# Patient Record
Sex: FEMALE | Race: WHITE | NOT HISPANIC OR LATINO | Employment: OTHER | URBAN - METROPOLITAN AREA
[De-identification: names, ages, dates, MRNs, and addresses within clinical notes are randomized per-mention and may not be internally consistent; named-entity substitution may affect disease eponyms.]

---

## 2017-09-26 ENCOUNTER — RECORDS - HEALTHEAST (OUTPATIENT)
Dept: LAB | Facility: CLINIC | Age: 62
End: 2017-09-26

## 2017-09-26 LAB
CHOLEST SERPL-MCNC: 260 MG/DL
FASTING STATUS PATIENT QL REPORTED: YES
HDLC SERPL-MCNC: 52 MG/DL
LDLC SERPL CALC-MCNC: 177 MG/DL
TRIGL SERPL-MCNC: 155 MG/DL

## 2018-10-25 ENCOUNTER — RECORDS - HEALTHEAST (OUTPATIENT)
Dept: LAB | Facility: CLINIC | Age: 63
End: 2018-10-25

## 2018-10-25 LAB
ALBUMIN SERPL-MCNC: 4.3 G/DL (ref 3.5–5)
ALP SERPL-CCNC: 66 U/L (ref 45–120)
ALT SERPL W P-5'-P-CCNC: 34 U/L (ref 0–45)
ANION GAP SERPL CALCULATED.3IONS-SCNC: 14 MMOL/L (ref 5–18)
AST SERPL W P-5'-P-CCNC: 23 U/L (ref 0–40)
BILIRUB SERPL-MCNC: 0.8 MG/DL (ref 0–1)
BUN SERPL-MCNC: 21 MG/DL (ref 8–22)
CALCIUM SERPL-MCNC: 10.3 MG/DL (ref 8.5–10.5)
CHLORIDE BLD-SCNC: 105 MMOL/L (ref 98–107)
CHOLEST SERPL-MCNC: 149 MG/DL
CO2 SERPL-SCNC: 24 MMOL/L (ref 22–31)
CREAT SERPL-MCNC: 0.86 MG/DL (ref 0.6–1.1)
FASTING STATUS PATIENT QL REPORTED: YES
GFR SERPL CREATININE-BSD FRML MDRD: >60 ML/MIN/1.73M2
GLUCOSE BLD-MCNC: 114 MG/DL (ref 70–125)
HDLC SERPL-MCNC: 47 MG/DL
LDLC SERPL CALC-MCNC: 84 MG/DL
POTASSIUM BLD-SCNC: 4.1 MMOL/L (ref 3.5–5)
PROT SERPL-MCNC: 7 G/DL (ref 6–8)
SODIUM SERPL-SCNC: 143 MMOL/L (ref 136–145)
TRIGL SERPL-MCNC: 89 MG/DL

## 2018-11-08 ENCOUNTER — HOSPITAL ENCOUNTER (OUTPATIENT)
Dept: MAMMOGRAPHY | Facility: CLINIC | Age: 63
Discharge: HOME | End: 2018-11-08
Attending: FAMILY MEDICINE

## 2018-11-08 DIAGNOSIS — Z12.31 VISIT FOR SCREENING MAMMOGRAM: ICD-10-CM

## 2019-10-21 ENCOUNTER — RECORDS - HEALTHEAST (OUTPATIENT)
Dept: LAB | Facility: CLINIC | Age: 64
End: 2019-10-21

## 2019-10-21 LAB
ALBUMIN SERPL-MCNC: 4.4 G/DL (ref 3.5–5)
ALP SERPL-CCNC: 93 U/L (ref 45–120)
ALT SERPL W P-5'-P-CCNC: 67 U/L (ref 0–45)
ANION GAP SERPL CALCULATED.3IONS-SCNC: 15 MMOL/L (ref 5–18)
AST SERPL W P-5'-P-CCNC: 60 U/L (ref 0–40)
BILIRUB SERPL-MCNC: 0.8 MG/DL (ref 0–1)
BUN SERPL-MCNC: 21 MG/DL (ref 8–22)
CALCIUM SERPL-MCNC: 9.9 MG/DL (ref 8.5–10.5)
CHLORIDE BLD-SCNC: 107 MMOL/L (ref 98–107)
CHOLEST SERPL-MCNC: 173 MG/DL
CO2 SERPL-SCNC: 19 MMOL/L (ref 22–31)
CREAT SERPL-MCNC: 0.83 MG/DL (ref 0.6–1.1)
FASTING STATUS PATIENT QL REPORTED: NORMAL
GFR SERPL CREATININE-BSD FRML MDRD: >60 ML/MIN/1.73M2
GLUCOSE BLD-MCNC: 111 MG/DL (ref 70–125)
HDLC SERPL-MCNC: 55 MG/DL
LDLC SERPL CALC-MCNC: 101 MG/DL
POTASSIUM BLD-SCNC: 4.6 MMOL/L (ref 3.5–5)
PROT SERPL-MCNC: 7.3 G/DL (ref 6–8)
SODIUM SERPL-SCNC: 141 MMOL/L (ref 136–145)
TRIGL SERPL-MCNC: 86 MG/DL

## 2020-06-22 ENCOUNTER — RECORDS - HEALTHEAST (OUTPATIENT)
Dept: LAB | Facility: CLINIC | Age: 65
End: 2020-06-22

## 2020-06-22 LAB
ANION GAP SERPL CALCULATED.3IONS-SCNC: 17 MMOL/L (ref 5–18)
BUN SERPL-MCNC: 22 MG/DL (ref 8–22)
CALCIUM SERPL-MCNC: 10.3 MG/DL (ref 8.5–10.5)
CHLORIDE BLD-SCNC: 106 MMOL/L (ref 98–107)
CO2 SERPL-SCNC: 17 MMOL/L (ref 22–31)
CREAT SERPL-MCNC: 0.87 MG/DL (ref 0.6–1.1)
GFR SERPL CREATININE-BSD FRML MDRD: >60 ML/MIN/1.73M2
GLUCOSE BLD-MCNC: 95 MG/DL (ref 70–125)
POTASSIUM BLD-SCNC: ABNORMAL MMOL/L
SODIUM SERPL-SCNC: 140 MMOL/L (ref 136–145)

## 2020-07-20 ENCOUNTER — HOSPITAL ENCOUNTER (OUTPATIENT)
Dept: MAMMOGRAPHY | Facility: CLINIC | Age: 65
Discharge: HOME | End: 2020-07-20
Attending: FAMILY MEDICINE

## 2020-07-20 DIAGNOSIS — Z12.31 VISIT FOR SCREENING MAMMOGRAM: ICD-10-CM

## 2020-07-21 ENCOUNTER — RECORDS - HEALTHEAST (OUTPATIENT)
Dept: ADMINISTRATIVE | Facility: OTHER | Age: 65
End: 2020-07-21

## 2020-07-28 ENCOUNTER — HOSPITAL ENCOUNTER (OUTPATIENT)
Dept: MAMMOGRAPHY | Facility: CLINIC | Age: 65
Discharge: HOME | End: 2020-07-28
Attending: FAMILY MEDICINE

## 2020-07-28 DIAGNOSIS — N64.89 BREAST ASYMMETRY: ICD-10-CM

## 2020-08-05 ENCOUNTER — RECORDS - HEALTHEAST (OUTPATIENT)
Dept: ADMINISTRATIVE | Facility: OTHER | Age: 65
End: 2020-08-05

## 2020-09-22 ENCOUNTER — RECORDS - HEALTHEAST (OUTPATIENT)
Dept: LAB | Facility: CLINIC | Age: 65
End: 2020-09-22

## 2020-09-22 LAB
ALBUMIN SERPL-MCNC: 4.7 G/DL (ref 3.5–5)
ALP SERPL-CCNC: 67 U/L (ref 45–120)
ALT SERPL W P-5'-P-CCNC: 29 U/L (ref 0–45)
ANION GAP SERPL CALCULATED.3IONS-SCNC: 18 MMOL/L (ref 5–18)
AST SERPL W P-5'-P-CCNC: 29 U/L (ref 0–40)
BILIRUB SERPL-MCNC: 0.9 MG/DL (ref 0–1)
BUN SERPL-MCNC: 24 MG/DL (ref 8–22)
CALCIUM SERPL-MCNC: 10.6 MG/DL (ref 8.5–10.5)
CHLORIDE BLD-SCNC: 103 MMOL/L (ref 98–107)
CHOLEST SERPL-MCNC: 206 MG/DL
CO2 SERPL-SCNC: 19 MMOL/L (ref 22–31)
CREAT SERPL-MCNC: 0.99 MG/DL (ref 0.6–1.1)
FASTING STATUS PATIENT QL REPORTED: YES
GFR SERPL CREATININE-BSD FRML MDRD: 56 ML/MIN/1.73M2
GLUCOSE BLD-MCNC: 122 MG/DL (ref 70–125)
HDLC SERPL-MCNC: 57 MG/DL
LDLC SERPL CALC-MCNC: 128 MG/DL
POTASSIUM BLD-SCNC: 4.3 MMOL/L (ref 3.5–5)
PROT SERPL-MCNC: 7.9 G/DL (ref 6–8)
SODIUM SERPL-SCNC: 140 MMOL/L (ref 136–145)
TRIGL SERPL-MCNC: 104 MG/DL

## 2021-01-29 ENCOUNTER — HOSPITAL ENCOUNTER (OUTPATIENT)
Dept: MAMMOGRAPHY | Facility: CLINIC | Age: 66
Discharge: HOME | End: 2021-01-29
Attending: FAMILY MEDICINE

## 2021-01-29 ENCOUNTER — RECORDS - HEALTHEAST (OUTPATIENT)
Dept: ADMINISTRATIVE | Facility: OTHER | Age: 66
End: 2021-01-29

## 2021-01-29 DIAGNOSIS — Z09 FOLLOW-UP EXAM, 3-6 MONTHS SINCE PREVIOUS EXAM: ICD-10-CM

## 2021-01-29 DIAGNOSIS — N64.89 OTHER SPECIFIED DISORDERS OF BREAST: ICD-10-CM

## 2021-03-11 ENCOUNTER — RECORDS - HEALTHEAST (OUTPATIENT)
Dept: SCHEDULING | Facility: CLINIC | Age: 66
End: 2021-03-11

## 2021-03-11 DIAGNOSIS — Z12.31 VISIT FOR SCREENING MAMMOGRAM: ICD-10-CM

## 2021-05-24 ENCOUNTER — RECORDS - HEALTHEAST (OUTPATIENT)
Dept: ADMINISTRATIVE | Facility: CLINIC | Age: 66
End: 2021-05-24

## 2021-05-25 ENCOUNTER — RECORDS - HEALTHEAST (OUTPATIENT)
Dept: ADMINISTRATIVE | Facility: CLINIC | Age: 66
End: 2021-05-25

## 2021-05-26 ENCOUNTER — RECORDS - HEALTHEAST (OUTPATIENT)
Dept: ADMINISTRATIVE | Facility: CLINIC | Age: 66
End: 2021-05-26

## 2021-05-27 ENCOUNTER — RECORDS - HEALTHEAST (OUTPATIENT)
Dept: ADMINISTRATIVE | Facility: CLINIC | Age: 66
End: 2021-05-27

## 2021-05-28 ENCOUNTER — RECORDS - HEALTHEAST (OUTPATIENT)
Dept: ADMINISTRATIVE | Facility: CLINIC | Age: 66
End: 2021-05-28

## 2021-05-29 ENCOUNTER — RECORDS - HEALTHEAST (OUTPATIENT)
Dept: ADMINISTRATIVE | Facility: CLINIC | Age: 66
End: 2021-05-29

## 2021-05-31 ENCOUNTER — RECORDS - HEALTHEAST (OUTPATIENT)
Dept: ADMINISTRATIVE | Facility: CLINIC | Age: 66
End: 2021-05-31
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
## 2021-06-26 ENCOUNTER — HEALTH MAINTENANCE LETTER (OUTPATIENT)
Age: 66
End: 2021-06-26

## 2021-07-13 ENCOUNTER — RECORDS - HEALTHEAST (OUTPATIENT)
Dept: ADMINISTRATIVE | Facility: CLINIC | Age: 66
End: 2021-07-13

## 2021-07-21 ENCOUNTER — RECORDS - HEALTHEAST (OUTPATIENT)
Dept: ADMINISTRATIVE | Facility: CLINIC | Age: 66
End: 2021-07-21

## 2021-07-22 ENCOUNTER — RECORDS - HEALTHEAST (OUTPATIENT)
Dept: SCHEDULING | Facility: CLINIC | Age: 66
End: 2021-07-22

## 2021-07-22 ENCOUNTER — ANCILLARY PROCEDURE (OUTPATIENT)
Dept: MAMMOGRAPHY | Facility: CLINIC | Age: 66
End: 2021-07-22
Attending: FAMILY MEDICINE
Payer: COMMERCIAL

## 2021-07-22 DIAGNOSIS — Z12.31 VISIT FOR SCREENING MAMMOGRAM: ICD-10-CM

## 2021-07-22 DIAGNOSIS — Z12.31 OTHER SCREENING MAMMOGRAM: ICD-10-CM

## 2021-07-22 PROCEDURE — 77063 BREAST TOMOSYNTHESIS BI: CPT

## 2021-09-07 ENCOUNTER — LAB REQUISITION (OUTPATIENT)
Dept: LAB | Facility: CLINIC | Age: 66
End: 2021-09-07

## 2021-09-07 DIAGNOSIS — E78.5 HYPERLIPIDEMIA, UNSPECIFIED: ICD-10-CM

## 2021-09-07 DIAGNOSIS — I10 ESSENTIAL (PRIMARY) HYPERTENSION: ICD-10-CM

## 2021-09-07 LAB
ALBUMIN SERPL-MCNC: 4.6 G/DL (ref 3.5–5)
ALP SERPL-CCNC: 54 U/L (ref 45–120)
ALT SERPL W P-5'-P-CCNC: 27 U/L (ref 0–45)
ANION GAP SERPL CALCULATED.3IONS-SCNC: 14 MMOL/L (ref 5–18)
AST SERPL W P-5'-P-CCNC: 22 U/L (ref 0–40)
BILIRUB SERPL-MCNC: 1.2 MG/DL (ref 0–1)
BUN SERPL-MCNC: 26 MG/DL (ref 8–22)
CALCIUM SERPL-MCNC: 11.2 MG/DL (ref 8.5–10.5)
CHLORIDE BLD-SCNC: 101 MMOL/L (ref 98–107)
CHOLEST SERPL-MCNC: 194 MG/DL
CO2 SERPL-SCNC: 25 MMOL/L (ref 22–31)
CREAT SERPL-MCNC: 0.97 MG/DL (ref 0.6–1.1)
GFR SERPL CREATININE-BSD FRML MDRD: 61 ML/MIN/1.73M2
GLUCOSE BLD-MCNC: 135 MG/DL (ref 70–125)
HDLC SERPL-MCNC: 64 MG/DL
LDLC SERPL CALC-MCNC: 111 MG/DL
POTASSIUM BLD-SCNC: 4.2 MMOL/L (ref 3.5–5)
PROT SERPL-MCNC: 7.6 G/DL (ref 6–8)
SODIUM SERPL-SCNC: 140 MMOL/L (ref 136–145)
TRIGL SERPL-MCNC: 96 MG/DL

## 2021-09-07 PROCEDURE — 80053 COMPREHEN METABOLIC PANEL: CPT | Performed by: FAMILY MEDICINE

## 2021-09-07 PROCEDURE — 80061 LIPID PANEL: CPT | Performed by: FAMILY MEDICINE

## 2021-10-16 ENCOUNTER — HEALTH MAINTENANCE LETTER (OUTPATIENT)
Age: 66
End: 2021-10-16

## 2022-06-08 ENCOUNTER — LAB REQUISITION (OUTPATIENT)
Dept: LAB | Facility: CLINIC | Age: 67
End: 2022-06-08

## 2022-06-08 DIAGNOSIS — E78.5 HYPERLIPIDEMIA, UNSPECIFIED: ICD-10-CM

## 2022-06-08 DIAGNOSIS — I10 ESSENTIAL (PRIMARY) HYPERTENSION: ICD-10-CM

## 2022-06-08 LAB
ALBUMIN SERPL-MCNC: 4.3 G/DL (ref 3.5–5)
ALP SERPL-CCNC: 46 U/L (ref 45–120)
ALT SERPL W P-5'-P-CCNC: 23 U/L (ref 0–45)
ANION GAP SERPL CALCULATED.3IONS-SCNC: 16 MMOL/L (ref 5–18)
AST SERPL W P-5'-P-CCNC: 24 U/L (ref 0–40)
BILIRUB SERPL-MCNC: 1.3 MG/DL (ref 0–1)
BUN SERPL-MCNC: 27 MG/DL (ref 8–22)
CALCIUM SERPL-MCNC: 10.7 MG/DL (ref 8.5–10.5)
CHLORIDE BLD-SCNC: 100 MMOL/L (ref 98–107)
CHOLEST SERPL-MCNC: 193 MG/DL
CO2 SERPL-SCNC: 23 MMOL/L (ref 22–31)
CREAT SERPL-MCNC: 0.97 MG/DL (ref 0.6–1.1)
GFR SERPL CREATININE-BSD FRML MDRD: 63 ML/MIN/1.73M2
GLUCOSE BLD-MCNC: 140 MG/DL (ref 70–125)
HDLC SERPL-MCNC: 63 MG/DL
LDLC SERPL CALC-MCNC: 106 MG/DL
POTASSIUM BLD-SCNC: 4.1 MMOL/L (ref 3.5–5)
PROT SERPL-MCNC: 7.6 G/DL (ref 6–8)
SODIUM SERPL-SCNC: 139 MMOL/L (ref 136–145)
TRIGL SERPL-MCNC: 120 MG/DL

## 2022-06-08 PROCEDURE — 82040 ASSAY OF SERUM ALBUMIN: CPT | Performed by: FAMILY MEDICINE

## 2022-06-08 PROCEDURE — 80053 COMPREHEN METABOLIC PANEL: CPT | Performed by: FAMILY MEDICINE

## 2022-06-08 PROCEDURE — 80061 LIPID PANEL: CPT | Performed by: FAMILY MEDICINE

## 2022-07-23 ENCOUNTER — HEALTH MAINTENANCE LETTER (OUTPATIENT)
Age: 67
End: 2022-07-23

## 2022-07-26 ENCOUNTER — ANCILLARY PROCEDURE (OUTPATIENT)
Dept: MAMMOGRAPHY | Facility: CLINIC | Age: 67
End: 2022-07-26
Attending: FAMILY MEDICINE
Payer: COMMERCIAL

## 2022-07-26 DIAGNOSIS — Z12.31 VISIT FOR SCREENING MAMMOGRAM: ICD-10-CM

## 2022-07-26 PROCEDURE — 77067 SCR MAMMO BI INCL CAD: CPT

## 2022-09-25 ENCOUNTER — HEALTH MAINTENANCE LETTER (OUTPATIENT)
Age: 67
End: 2022-09-25

## 2023-06-06 ENCOUNTER — LAB REQUISITION (OUTPATIENT)
Dept: LAB | Facility: CLINIC | Age: 68
End: 2023-06-06

## 2023-06-06 DIAGNOSIS — I10 ESSENTIAL (PRIMARY) HYPERTENSION: ICD-10-CM

## 2023-06-06 DIAGNOSIS — E78.5 HYPERLIPIDEMIA, UNSPECIFIED: ICD-10-CM

## 2023-06-06 DIAGNOSIS — E83.52 HYPERCALCEMIA: ICD-10-CM

## 2023-06-06 LAB
ALBUMIN SERPL BCG-MCNC: 4.8 G/DL (ref 3.5–5.2)
ALP SERPL-CCNC: 55 U/L (ref 35–104)
ALT SERPL W P-5'-P-CCNC: 30 U/L (ref 10–35)
ANION GAP SERPL CALCULATED.3IONS-SCNC: 14 MMOL/L (ref 7–15)
AST SERPL W P-5'-P-CCNC: 24 U/L (ref 10–35)
BILIRUB SERPL-MCNC: 1.1 MG/DL
BUN SERPL-MCNC: 21.8 MG/DL (ref 8–23)
CALCIUM SERPL-MCNC: 10.6 MG/DL (ref 8.8–10.2)
CHLORIDE SERPL-SCNC: 100 MMOL/L (ref 98–107)
CHOLEST SERPL-MCNC: 190 MG/DL
CREAT SERPL-MCNC: 0.97 MG/DL (ref 0.51–0.95)
DEPRECATED HCO3 PLAS-SCNC: 25 MMOL/L (ref 22–29)
GFR SERPL CREATININE-BSD FRML MDRD: 63 ML/MIN/1.73M2
GLUCOSE SERPL-MCNC: 149 MG/DL (ref 70–99)
HDLC SERPL-MCNC: 61 MG/DL
LDLC SERPL CALC-MCNC: 104 MG/DL
NONHDLC SERPL-MCNC: 129 MG/DL
POTASSIUM SERPL-SCNC: 4.5 MMOL/L (ref 3.4–5.3)
PROT SERPL-MCNC: 7.2 G/DL (ref 6.4–8.3)
PTH-INTACT SERPL-MCNC: 18 PG/ML (ref 15–65)
SODIUM SERPL-SCNC: 139 MMOL/L (ref 136–145)
TRIGL SERPL-MCNC: 124 MG/DL

## 2023-06-06 PROCEDURE — 80053 COMPREHEN METABOLIC PANEL: CPT | Performed by: FAMILY MEDICINE

## 2023-06-06 PROCEDURE — 82306 VITAMIN D 25 HYDROXY: CPT | Performed by: FAMILY MEDICINE

## 2023-06-06 PROCEDURE — 80061 LIPID PANEL: CPT | Performed by: FAMILY MEDICINE

## 2023-06-06 PROCEDURE — 83970 ASSAY OF PARATHORMONE: CPT | Performed by: FAMILY MEDICINE

## 2023-06-07 LAB — DEPRECATED CALCIDIOL+CALCIFEROL SERPL-MC: 39 UG/L (ref 20–75)

## 2023-08-06 ENCOUNTER — HEALTH MAINTENANCE LETTER (OUTPATIENT)
Age: 68
End: 2023-08-06

## 2023-10-02 ENCOUNTER — ANCILLARY PROCEDURE (OUTPATIENT)
Dept: MAMMOGRAPHY | Facility: CLINIC | Age: 68
End: 2023-10-02
Attending: FAMILY MEDICINE
Payer: COMMERCIAL

## 2023-10-02 DIAGNOSIS — Z12.31 VISIT FOR SCREENING MAMMOGRAM: ICD-10-CM

## 2023-10-02 PROCEDURE — 77067 SCR MAMMO BI INCL CAD: CPT

## 2023-12-24 ENCOUNTER — HEALTH MAINTENANCE LETTER (OUTPATIENT)
Age: 68
End: 2023-12-24

## 2024-03-02 ENCOUNTER — HEALTH MAINTENANCE LETTER (OUTPATIENT)
Age: 69
End: 2024-03-02

## 2024-03-14 ENCOUNTER — TRANSFERRED RECORDS (OUTPATIENT)
Dept: HEALTH INFORMATION MANAGEMENT | Facility: CLINIC | Age: 69
End: 2024-03-14
Payer: COMMERCIAL

## 2024-03-14 ENCOUNTER — LAB REQUISITION (OUTPATIENT)
Dept: LAB | Facility: CLINIC | Age: 69
End: 2024-03-14

## 2024-03-14 DIAGNOSIS — I48.91 UNSPECIFIED ATRIAL FIBRILLATION (H): ICD-10-CM

## 2024-03-14 LAB
HBA1C MFR BLD: 6.9 % (ref 4.2–6.1)
INR PPP: 1.1 (ref 0.85–1.15)

## 2024-03-14 PROCEDURE — 80048 BASIC METABOLIC PNL TOTAL CA: CPT | Performed by: FAMILY MEDICINE

## 2024-03-14 PROCEDURE — 84443 ASSAY THYROID STIM HORMONE: CPT | Performed by: FAMILY MEDICINE

## 2024-03-14 PROCEDURE — 85610 PROTHROMBIN TIME: CPT | Performed by: FAMILY MEDICINE

## 2024-03-15 ENCOUNTER — MEDICAL CORRESPONDENCE (OUTPATIENT)
Dept: HEALTH INFORMATION MANAGEMENT | Facility: CLINIC | Age: 69
End: 2024-03-15
Payer: COMMERCIAL

## 2024-03-15 LAB
ANION GAP SERPL CALCULATED.3IONS-SCNC: 16 MMOL/L (ref 7–15)
BUN SERPL-MCNC: 31.4 MG/DL (ref 8–23)
CALCIUM SERPL-MCNC: 11.6 MG/DL (ref 8.8–10.2)
CHLORIDE SERPL-SCNC: 98 MMOL/L (ref 98–107)
CREAT SERPL-MCNC: 1.08 MG/DL (ref 0.51–0.95)
DEPRECATED HCO3 PLAS-SCNC: 25 MMOL/L (ref 22–29)
EGFRCR SERPLBLD CKD-EPI 2021: 55 ML/MIN/1.73M2
GLUCOSE SERPL-MCNC: 148 MG/DL (ref 70–99)
POTASSIUM SERPL-SCNC: 4 MMOL/L (ref 3.4–5.3)
SODIUM SERPL-SCNC: 139 MMOL/L (ref 135–145)
TSH SERPL DL<=0.005 MIU/L-ACNC: 3.89 UIU/ML (ref 0.3–4.2)

## 2024-03-21 ENCOUNTER — DOCUMENTATION ONLY (OUTPATIENT)
Dept: CARDIOLOGY | Facility: CLINIC | Age: 69
End: 2024-03-21

## 2024-03-21 ENCOUNTER — OFFICE VISIT (OUTPATIENT)
Dept: CARDIOLOGY | Facility: CLINIC | Age: 69
End: 2024-03-21
Payer: COMMERCIAL

## 2024-03-21 ENCOUNTER — TELEPHONE (OUTPATIENT)
Dept: CARDIOLOGY | Facility: CLINIC | Age: 69
End: 2024-03-21

## 2024-03-21 VITALS
RESPIRATION RATE: 16 BRPM | HEART RATE: 123 BPM | WEIGHT: 174.6 LBS | DIASTOLIC BLOOD PRESSURE: 95 MMHG | SYSTOLIC BLOOD PRESSURE: 131 MMHG | OXYGEN SATURATION: 97 %

## 2024-03-21 DIAGNOSIS — I48.19 PERSISTENT ATRIAL FIBRILLATION (H): Primary | ICD-10-CM

## 2024-03-21 PROCEDURE — 93000 ELECTROCARDIOGRAM COMPLETE: CPT | Performed by: STUDENT IN AN ORGANIZED HEALTH CARE EDUCATION/TRAINING PROGRAM

## 2024-03-21 PROCEDURE — 99204 OFFICE O/P NEW MOD 45 MIN: CPT | Performed by: INTERNAL MEDICINE

## 2024-03-21 PROCEDURE — G2211 COMPLEX E/M VISIT ADD ON: HCPCS | Performed by: INTERNAL MEDICINE

## 2024-03-21 RX ORDER — B-COMPLEX WITH VITAMIN C
TABLET ORAL
COMMUNITY

## 2024-03-21 RX ORDER — ATORVASTATIN CALCIUM 10 MG/1
1 TABLET, FILM COATED ORAL DAILY
COMMUNITY

## 2024-03-21 RX ORDER — SOTALOL HYDROCHLORIDE 80 MG/1
80 TABLET ORAL 2 TIMES DAILY
Qty: 60 TABLET | Refills: 11 | Status: SHIPPED | OUTPATIENT
Start: 2024-03-21 | End: 2024-05-23

## 2024-03-21 RX ORDER — VITAMIN E 268 MG
1 CAPSULE ORAL DAILY
COMMUNITY

## 2024-03-21 RX ORDER — METOPROLOL TARTRATE 25 MG/1
25 TABLET, FILM COATED ORAL 2 TIMES DAILY
COMMUNITY
Start: 2024-03-14 | End: 2024-03-29

## 2024-03-21 RX ORDER — METFORMIN HCL 500 MG
500 TABLET, EXTENDED RELEASE 24 HR ORAL 2 TIMES DAILY WITH MEALS
COMMUNITY
Start: 2023-06-28

## 2024-03-21 RX ORDER — RIVAROXABAN 20 MG/1
20 TABLET, FILM COATED ORAL DAILY
COMMUNITY
Start: 2024-03-14

## 2024-03-21 RX ORDER — ASPIRIN 81 MG/1
81 TABLET ORAL DAILY
Status: ON HOLD | COMMUNITY
End: 2024-04-04

## 2024-03-21 RX ORDER — VIT A/VIT C/VIT E/ZINC/COPPER 2148-113
1 TABLET ORAL 2 TIMES DAILY
COMMUNITY

## 2024-03-21 RX ORDER — AMLODIPINE AND BENAZEPRIL HYDROCHLORIDE 5; 40 MG/1; MG/1
1 CAPSULE ORAL DAILY
COMMUNITY

## 2024-03-21 NOTE — TELEPHONE ENCOUNTER
Yuki Seals MD  You1 hour ago (2:54 PM)     AYLEEN Sweeney,    Thanks for looking into that!  Can plan to start sotalol late next week with ECG 3/29/2024, TTE and starting 30d MCT next week, and DCCV the following week.  She started rivaroxaban on 3/14/2024 - if DCCV is within 3 weeks of that date, she would need DEANA/DCCV.  If outside of 3 weeks from 3/14/2024, can proceed with DCCV without DEANA assuming no missed rivaroxaban doses.    Yuki Son MD4 hours ago (11:44 AM)     CJ  Dr. Seals,  There are no DEANA/DCCV spots available next week. She is also concerned with driving in the snow early next week.  Are you ok with starting Sotalol late next week, doing an EKG on Friday 3-29-24 and then DCCV with NO DEAAN the week after as long as she has been taking Xarelto appropriately?  Then continue with 30 MCOT/echo after cardioversion is complete?  Thank you,  Zahra

## 2024-03-21 NOTE — TELEPHONE ENCOUNTER
Noted.  Phone call to patient and reviewed the below information.  Soonest available DEANA is 4-4-24.  Pt states she can tolerate her symptoms and is agreeable to Cardioversion without DEANA ( this will be 21 days of Xarelto) on 4-4-24.  She has been taking Xarelto with a meal every day without missing any doses.  Encouraged her to continue taking daily with a meal and no missed doses.     Will try to reschedule MCOT, Echo and EKG to late next week.  Will discuss Sotalol start when this is scheduled.    She states understanding, answered all questions.

## 2024-03-21 NOTE — PROGRESS NOTES
H&P  PMD: []  Date: Card OV: [x]  Date: 3-21 Sent for Teach []   Orders: I [x] P  [x]      AC: Franki NP Med Review: NEEDS review  -Sotalol 80 mg bid  -Metoprolol tartrate 25 mg bid    DM Meds:  Metformin  GLP-1:None       Cathy Randall, 1955, [id number]  Home:[phone] (home) Cell:[phone] (mobile)  Emergency Contact: Robyn Osorio [phone]  PCP: Ankur Queen, [phone]    +++Important patient information for CSC/Cath Lab staff : None+++    [facility] EP Cath Lab Procedure Order   Procedure:Cardioversion for AF  Ordering Provider: Dr Arnel Aguilera Ordered and Prepped: 3/21/2024 Zahra Eubanks RN  Anticipated Case Duration:  Standard  Scheduling Needs/Timeframe: after 4-4-24  Scheduling Contact: Please contact pt to schedule  Cardiology Follow Up Apt s/p: Standard- EP LYNNETTE @ 4-6 wks or previously scheduled General Card apt  Current Device/Device Co Needed for Procedure: None NoneNone  Pre-Procedural Testing needed: None  Anesthesia:  General    [facility] EP Cath Lab Prep   H&P:  Compled by cardiology on 3-21-24 if scheduled within 30 days, pt to schedule with PMD if procedure outside of this timeframe  Pre-op Labs: K if pt taking diuretic medication or hx of low Potassium, Beta HcG if appropriate, and INR if on Warfarin will be ordered AM of procedure and Review of most recent labs, WEL for procedure  T&S Pre-Procedure Review: T&S is not required for DCCV/DFT Testing  Medical Records Pertinent for Procedure:  None  Iodinated Contrast Dye Allergies (Does not include Shellfish, Egg, and/or Iodine Allergy): NA  GLP-1 Protocol: If on Dulaglutide (Trulicity) (weekly)- Injection hold 7 days prior to procedure  , Exenatide extended release (Bydureon bcise) (weekly)- Injection hold 7 days prior to procedure, Exenatide (Byetta) (twice daily)- Oral Tablet hold day prior and morning of procedure and for Injection hold 7 days prior to procedure, Semaglutide (Ozempic) (weekly)- Injection and Oral hold 7 days  prior to procedure, Liraglutide (Victoza, Saxenda) (daily)- Injection hold day prior and morning of procedure    Allergies   Allergen Reactions    Aspirin Unknown     Doesn't take due to bleeding risk    Penicillins Unknown    Sulfa (Sulfonamide Antibiotics) [Sulfa Antibiotics] Unknown       Current Outpatient Medications:     amLODIPine-benazepril (LOTREL) 5-40 MG capsule, Take 1 capsule by mouth daily, Disp: , Rfl:     aspirin 81 MG EC tablet, Take 81 mg by mouth daily, Disp: , Rfl:     atorvastatin (LIPITOR) 10 MG tablet, Take 1 tablet by mouth daily, Disp: , Rfl:     Calcium Carbonate-Vitamin D 600-5 MG-MCG TABS, 2 tablet with a meal Orally Once a day, Disp: , Rfl:     metFORMIN (GLUCOPHAGE XR) 500 MG 24 hr tablet, Take 500 mg by mouth 2 times daily (with meals), Disp: , Rfl:     metoprolol tartrate (LOPRESSOR) 25 MG tablet, Take 25 mg by mouth 2 times daily, Disp: , Rfl:     Multiple Vitamin (MULTIVITAMIN ADULT PO), Take by mouth daily, Disp: , Rfl:     Multiple Vitamins-Minerals (PRESERVISION AREDS) TABS, 2 tab orally BID, Disp: , Rfl:     sotalol (BETAPACE) 80 MG tablet, Take 1 tablet (80 mg) by mouth 2 times daily, Disp: 60 tablet, Rfl: 11    vitamin E (TOCOPHEROL) 400 units (180 mg) capsule, Take 1 tablet by mouth daily, Disp: , Rfl:     XARELTO ANTICOAGULANT 20 MG TABS tablet, Take 20 mg by mouth daily, Disp: , Rfl:     Documentation Date:3/21/2024 9:40 AM  Zahra Eubanks RN

## 2024-03-21 NOTE — PATIENT INSTRUCTIONS
[facility]  Cardiac Electrophysiology  [address]  [place] [address]   Office: [phone]  Fax: [phone]     Thank you for seeing us in clinic today - it is a pleasure to be a part of your care team.  Below is a summary of our plan from today's visit.       You have persistent atrial fibrillation.  We reviewed atrial fibrillation, stroke risk prevention, and atrial fibrillation treatment options including antiarrhythmic drug therapy, catheter ablation.      We will plan for the following:  - echocardiogram (heart ultrasound)  - 30 day mobile cardiac telemetry  - start sotalol 80mg twice daily.  Our office will coordinate an ECG in 3 days, transesophageal echocardiogram and cardioversion thereafter  - continue metoprolol 25mg twice daily  - continue riaroxaban 20mg daily  - consider a [employer] device or an Apple Watch (ECG capability)     Please do not hesitate to be in touch with our office at [phone] with any questions that may arise.       Thank you for trusting us with your care,    Yuki Seals MD  Clinical Cardiac Electrophysiology  [facility]  [address]  [place] [address]   Office: [phone]  Fax: [phone]        ATRIAL FIBRILLATION: Patient Information    What is atrial fibrillation?  Atrial fibrillation (AF, A-fib) is a common heart rhythm problem (arrhythmia) occurring within the upper chambers of the heart (the atria).  In normal rhythm, the upper and lower chambers of the heart are electrically driven to contract in a coordinated sequence.  In atrial fibrillation, the atria lose their ability to contract because of rapid and chaotic electrical activity.  The lower chambers of the heart (the ventricles) continue to pump blood throughout the body, though with irregular and often faster rate due to the chaotic activity within the atria.        How do I know if I have atrial fibrillation?   Some  people may feel their heart beating faster, harder, or irregularly while in atrial fibrillation.  Others may be lightheaded, fatigued, feel weak or tired or become more short of breath especially with activities.  Some patients have no symptoms at all.  Atrial fibrillation may be found due to an irregular pulse or on an electrocardiogram (ECG). Atrial fibrillation can start and stop on its own, and episodes can last from seconds to several months.      How common is atrial fibrillation?   An estimated 3-6 million people in the [place] have atrial fibrillation.  Atrial fibrillation is a common heart rhythm problem for older persons, affecting as estimated 12-15% of people over the age of 65 years of age.    What causes atrial fibrillation?   Age is the most important risk factor for atrial fibrillation.  Atrial fibrillation is more common in people with other heart disease, high blood pressure, diabetes, obesity, sleep apnea and in people who regularly consume alcohol.  Surgery, lung disease, or thyroid problems can lead to atrial fibrillation.  Atrial fibrillation has multiple possible causes, and in most cases a single cause cannot be found.  Atrial fibrillation is a progressive condition, usually starting with at an early stage with short and infrequent episodes.  In later stages of disease, more frequent and longer lasting episodes of atrial fibrillation occur, ultimately culminating in episodes which do not spontaneously terminate.  Generally, more enlargement and scarring within the upper chambers of the heart is observed as atrial fibrillation progresses from early to late-stage disease.    How is atrial fibrillation diagnosed and evaluated?    Because of its start-stop nature, atrial fibrillation can be challenging to diagnose.  Atrial fibrillation is most commonly diagnosed via cardiac rhythm recordings - either an ECG or wearable cardiac rhythm monitor.  For patients with pacemakers, defibrillators or  implantable loop recorders, atrial fibrillation may be recorded via these devices.  Recently, commercially available devices (eg. Apple Watch, [employer] device, certain FitBit devices, others) can allow patients to take 30 second cardiac rhythm recordings which may document atrial fibrillation.  Once atrial fibrillation is diagnosed, additional tests include blood tests and an echocardiogram.  The echocardiogram uses ultrasound to look at your heart to assess your cardiac function and evaluate for other heart disease.  Additional evaluation may include CT or MRI studies.    Is atrial fibrillation dangerous?   Atrial fibrillation is not usually a life-threatening arrhythmia.  The most serious consequences of atrial fibrillation including stroke and worsening of overall cardiac function.  While in atrial fibrillation, the upper cardiac chambers do not contract normally, resulting in slower blood flow and increased risk of clot formation.  If this blood clot becomes detached from the heart a stroke can occur.  Unfortunately, stroke can be the first sign of atrial fibrillation for some people.  With a stroke, you may notice abnormal sensation, weakness on one side of the body or face, changes in your vision or speech.  If you have any of these signs, you should contact EMS and be evaluated in an emergency room as soon as possible.      How is atrial fibrillation treated?     Several treatment options exist for suppressing atrial fibrillation - however, it is not an easily curable arrhythmia.  The first goal in managing atrial fibrillation is to minimize stroke risk.  The second goal is to improve symptoms associated with atrial fibrillation.  Finally, in patients with reduced cardiac function, maintaining normal rhythm can help improve cardiac function.      Blood thinners are used to reduce the risk of stroke in patients with high estimated stroke risk related to atrial fibrillation.  For patients at higher risk of  bleeding related to blood thinner use, implantable devices may be an option to reduce stroke risk without the need for long term blood thinner use.      Atrial fibrillation can be managed via two strategies: rate control and rhythm control.  In a rate control strategy, continued atrial fibrillation is accepted and medications (eg. beta-blockers or calcium channel blockers) are used to control the lower chamber rate.  In a rhythm control strategy, anti-arrhythmic medications or catheter ablation are used to maintain normal cardiac rhythm and slow disease progression by suppressing atrial fibrillation.  A procedure called a cardioversion, in which an electric shock is delivered through patches placed on the chest wall while under deep sedation, can be performed to temporarily restore normal cardiac rhythm, though does not address the chance of atrial fibrillation recurrence.  Treatments are more effective for earlier rather than later stage atrial fibrillation.  Lifestyle modifications (maintaining a healthy weight, aerobic exercise, diagnosing and treating sleep apnea, and minimizing alcohol intake) are important elements of atrial fibrillation rhythm control.     What is catheter ablation for atrial fibrillation?  Cardiac catheter ablation is a commonly performed, minimally invasive procedure performed by a cardiac electrophysiologist to treat many different cardiac rhythm abnormalities.  During catheter ablation, long, thin, flexible tubes are advanced into the heart via small sheaths inserted into the femoral veins and thermal energy (either heating or cooling) is applied within the heart to disrupt abnormal electrical activity.  Atrial fibrillation ablation is performed under general anesthesia, with procedures generally taking approximately 2-3 hours.  Patients are typically observed for 3-5 hours after the ablation, and in most cases can be discharged home the same day.  Atrial fibrillation ablation is  associated with better outcomes (mortality, cardiovascular hospitalizations, atrial arrhythmia recurrences) compared to antiarrhythmic drug therapy.  However, atrial fibrillation recurrences are not uncommon, and repeat catheter ablation may be offered.  Your electrophysiology team can review atrial fibrillation ablation, anticipated success rates, risks, and recovery expectations with you.    What are anti-arrhythmic medications?  Anti-arrhythmic medications are specialized drugs which alter cardiac electrical functioning to suppress arrhythmias.  There are several anti-arrhythmic medications available, each with its own success rate and side effects.  Some anti-arrhythmic medications are less effective though safer to use, others are more effective though have serious potential toxicities.  Atrial fibrillation recurrences are common and may require dose adjustment or change in antiarrhythmic therapy.  Your electrophysiology team will carefully consider which medication would be the best and safest for your particular case.      Can I live a normal life?    The goal of atrial fibrillation management is for patients to live normal lives without being limited by symptoms related to atrial fibrillation.    Are any additional educational resources available?  There are a number of excellent atrial fibrillation education resources available to you online.  A few options you may wish to review include:  hrsonline.org/guide-atrial-fibrillation  afibmatters.org  getsmartaboutafib.com  stopaf.com    What comes next?    Consider your management options and let us know how we can help in your decision process.  Please take medications as they have been prescribed.  You should also get any tests that may have been ordered for you.      When to Call Your Doctor or seek emergency care:  Call your doctor or seek emergency care if you have any significant changes with the  following:  Weakness  Dizziness  Fainting  Fatigue  Shortness of breath  Chest pain with increased activity  If you are concerned that your heart rate is too fast or too slow  Bleeding that does not stop in 10 minutes  Coughing or throwing up blood  Bloody diarrhea or bleeding hemorrhoids  Dark-colored urine or black stool  Allergic reactions:  Rash  Itching  Swelling  Trouble breathing or swallowing      Please call the [facility] at [phone] if you have concerns about your symptoms, your medicines, or your follow-up appointments.

## 2024-03-21 NOTE — TELEPHONE ENCOUNTER
Phone call to patient to discuss Cardioversion plans and starting Sotalol.    Orders for pt include:  Start Sotalol 80 mg bid, in 3 days have EKG to verify ryhthm  DEANA/DCCV late next week  30 day MCOT  TTE.    Pt states that she does not want to come to the clinic early next week due to possible extreme weather and poor driving conditions.  Suggest that she have EKG same day as her DEANA/DCCV.  Pt is very overwhelmed and confused about all of the testing suggested.  Reviewed her chart and discussed rationale for the above procedures.  She did not start Xarelto until 3-14-24 as ordered by her PMD.  Will clarify echo orders with Dr. Seals.  Pt will be contacted to schedule.

## 2024-03-21 NOTE — LETTER
3/21/2024    Ankur Queen MD  [address] [place] [address]    RE: Cathy Randall       Dear Colleague,     I had the [facility] of seeing Cathy Randall in the [facility].     [facility]  Cardiac Electrophysiology  [address]  [place] [address]   Office: [phone]  Fax: [phone]     Cardiac Electrophysiology Consultation    Patient: Cathy Randall   : 1955     Referring Provider: Ankur Queen MD  Primary Care Provider: Ankur Queen MD    CHIEF COMPLAINT/REASON FOR VISIT  Persistent atrial fibrillation      Assessment/Recommendations   Cathy Randall is a 68 year old female with persistent atrial fibrillation, HTN, T2DM referred by Dr. Queen for consultation regarding atrial fibrillation.    Stage 3B/Persistent atrial fibrillation - symptomatic with  mild exertional dyspnea, fatigue  [id number]  We reviewed atrial fibrillation physiology and considerations including managing stroke risk, rate control, cardioversion, antiarrhythmic drug therapy, and catheter ablation.  We discussed atrial fibrillation ablation procedures, anticipated success rates, the potential need for re-do ablation vs addition of anti-arrhythmic drugs, procedural risks (including groin bleeding, tamponade, phrenic or esophageal injury, stroke, pulmonary vein stenosis) and recovery expectations.  We will plan for the following:  - TTE  - 30 day mobile cardiac telemetry  - start sotalol 80mg twice daily (QT//458ms), ECG in 3 days, DEANA/DCCV thereafter  - continue metoprolol 25mg twice daily  - continue riaroxaban 20mg daily  - consider a [employer] device or an Apple Watch  - we discussed the ongoing importance of lifestyle modification (maintaining a healthy weight, aerobic activity, sleep apnea diagnosis and management, alcohol avoidance) as part of a long term strategy for atrial fibrillation management    Follow up: as above            History of Present Illness   Cathy Randall is a 68 year old female with persistent atrial fibrillation, HTN, T2DM referred by Dr. Queen for consultation regarding atrial fibrillation.    Mrs. Randall's atrial fibrillation history is as summarized below:  Symptoms: mild exertional dyspnea, fatigue  Symptom onset date: around 2/2024  Diagnosis date: 3/14/2024 (routine clinic viist)  Admissions/ER visits: none  Prior medical therapies: metoprolol (3/14/2024-present)  Prior DCCVs: none  Prior ablations: none  Percutaneous left atrial appendage occlusion: none    She denies chest pain, syncope.       Physical Examination  Review of Systems   VITALS: BP (!) 131/95 (BP Location: Left arm, Patient Position: Sitting, Cuff Size: Adult Regular)   Pulse (!) 123   Resp 16   Wt 79.2 kg (174 lb 9.6 oz)   SpO2 97%   Wt Readings from Last 3 Encounters:   No data found for Wt     CONSTITUTIONAL: well nourished, comfortable, no distress  EYES:  Conjunctivae pink, sclerae clear.    E/N/T:  Oral mucosa pink  RESPIRATORY:  Respiratory effort is normal  CARDIOVASCULAR:  irregular, normal S1 and S2  GASTROINTESTINAL:  Abdomen without masses or tenderness  EXTREMITIES:  No clubbing or cyanosis.    MUSCULOSKELETAL:  Overall grossly normal muscle strength  SKIN:  Overall, skin warm and dry, no lesions.  NEURO/PSYCH:  Oriented x 3 with normal affect.   Constitutional:  No weight loss or loss of appetite    Eyes:  No difficulty with vision, no double vision, no dry eyes  ENT:  No sore throat, difficulty swallowing; changes in hearing or tinnitus  Cardiovascular: As detailed above  Respiratory:  No cough  Musculoskeletal  No joint pain, muscle aches  Neurologic:  No syncope, lightheadedness, fainting spells   Hematologic: No easy bruising, excessive bleeding tendency   Gastrointestinal:  No jaundice, abdominal pain or abdominal bloating  Genitourinary: No changes in urinary habits, no trouble urinating    Psychiatric: No  "anxiety or depression      Medical History  Surgical History   No past medical history on file. No past surgical history on file.      Family History Social History   Family History   Problem Relation Age of Onset    Breast Cancer No family hx of                Medications  Allergies   No current outpatient medications on file.     Allergies   Allergen Reactions    Aspirin Unknown     Doesn't take due to bleeding risk    Penicillins Unknown    Sulfa (Sulfonamide Antibiotics) [Sulfa Antibiotics] Unknown          Lab Results    Chemistry CBC Cardiac Enzymes/BNP/TSH/INR   Recent Labs   Lab Test 03/14/24  1122      POTASSIUM 4.0   CHLORIDE 98   CO2 25   *   BUN 31.4*   CR 1.08*   GFRESTIMATED 55*   JOY 11.6*     Recent Labs   Lab Test 03/14/24  1122 06/06/23  0909 06/08/22  0921   CR 1.08* 0.97* 0.97          No results for input(s): \"WBC\", \"HGB\", \"HCT\", \"MCV\", \"PLT\" in the last 17805 hours.  No results for input(s): \"HGB\" in the last 33311 hours. No results for input(s): \"TROPONINI\" in the last 87244 hours.  No results for input(s): \"BNP\", \"NTBNPI\", \"NTBNP\" in the last 24941 hours.  Recent Labs   Lab Test 03/14/24  1122   TSH 3.89     Recent Labs   Lab Test 03/14/24  1122   INR 1.10         Data Review    ECGs (tracings independently reviewed)  3/21/2024 - AF, ventricular rate 123bpm         Cc: Ankur Queen MD Amila Dilusha William, MD  3/21/2024  8:32 AM      Thank you for allowing me to participate in the care of your patient.      Sincerely,     Yuki Seals MD     [facility]  cc:   Ankur Queen MD  [address] [place] [address]  "

## 2024-03-21 NOTE — PROGRESS NOTES
[facility]  Cardiac Electrophysiology  [address]  [place] [address]   Office: [phone]  Fax: [phone]     Cardiac Electrophysiology Consultation    Patient: Cathy Randall   : 1955     Referring Provider: Ankur Queen MD  Primary Care Provider: Ankur Queen MD    CHIEF COMPLAINT/REASON FOR VISIT  Persistent atrial fibrillation      Assessment/Recommendations   Cathy Randall is a 68 year old female with persistent atrial fibrillation, HTN, T2DM referred by Dr. Queen for consultation regarding atrial fibrillation.    Stage 3B/Persistent atrial fibrillation - symptomatic with  mild exertional dyspnea, fatigue  [id number]  We reviewed atrial fibrillation physiology and considerations including managing stroke risk, rate control, cardioversion, antiarrhythmic drug therapy, and catheter ablation.  We discussed atrial fibrillation ablation procedures, anticipated success rates, the potential need for re-do ablation vs addition of anti-arrhythmic drugs, procedural risks (including groin bleeding, tamponade, phrenic or esophageal injury, stroke, pulmonary vein stenosis) and recovery expectations.  We will plan for the following:  - TTE  - 30 day mobile cardiac telemetry  - start sotalol 80mg twice daily (QT//458ms), ECG in 3 days, DEANA/DCCV thereafter  - continue metoprolol 25mg twice daily  - continue riaroxaban 20mg daily  - consider a [employer] device or an Apple Watch  - we discussed the ongoing importance of lifestyle modification (maintaining a healthy weight, aerobic activity, sleep apnea diagnosis and management, alcohol avoidance) as part of a long term strategy for atrial fibrillation management    Follow up: as above           History of Present Illness   Cathy Randall is a 68 year old female with persistent atrial fibrillation, HTN, T2DM referred by Dr. Queen for consultation regarding atrial fibrillation.    Mrs. Randall's  atrial fibrillation history is as summarized below:  Symptoms: mild exertional dyspnea, fatigue  Symptom onset date: around 2/2024  Diagnosis date: 3/14/2024 (routine clinic viist)  Admissions/ER visits: none  Prior medical therapies: metoprolol (3/14/2024-present)  Prior DCCVs: none  Prior ablations: none  Percutaneous left atrial appendage occlusion: none    She denies chest pain, syncope.       Physical Examination  Review of Systems   VITALS: BP (!) 131/95 (BP Location: Left arm, Patient Position: Sitting, Cuff Size: Adult Regular)   Pulse (!) 123   Resp 16   Wt 79.2 kg (174 lb 9.6 oz)   SpO2 97%   Wt Readings from Last 3 Encounters:   No data found for Wt     CONSTITUTIONAL: well nourished, comfortable, no distress  EYES:  Conjunctivae pink, sclerae clear.    E/N/T:  Oral mucosa pink  RESPIRATORY:  Respiratory effort is normal  CARDIOVASCULAR:  irregular, normal S1 and S2  GASTROINTESTINAL:  Abdomen without masses or tenderness  EXTREMITIES:  No clubbing or cyanosis.    MUSCULOSKELETAL:  Overall grossly normal muscle strength  SKIN:  Overall, skin warm and dry, no lesions.  NEURO/PSYCH:  Oriented x 3 with normal affect.   Constitutional:  No weight loss or loss of appetite    Eyes:  No difficulty with vision, no double vision, no dry eyes  ENT:  No sore throat, difficulty swallowing; changes in hearing or tinnitus  Cardiovascular: As detailed above  Respiratory:  No cough  Musculoskeletal  No joint pain, muscle aches  Neurologic:  No syncope, lightheadedness, fainting spells   Hematologic: No easy bruising, excessive bleeding tendency   Gastrointestinal:  No jaundice, abdominal pain or abdominal bloating  Genitourinary: No changes in urinary habits, no trouble urinating    Psychiatric: No anxiety or depression      Medical History  Surgical History   No past medical history on file. No past surgical history on file.      Family History Social History   Family History   Problem Relation Age of Onset     "Breast Cancer No family hx of                Medications  Allergies   No current outpatient medications on file.     Allergies   Allergen Reactions    Aspirin Unknown     Doesn't take due to bleeding risk    Penicillins Unknown    Sulfa (Sulfonamide Antibiotics) [Sulfa Antibiotics] Unknown          Lab Results    Chemistry CBC Cardiac Enzymes/BNP/TSH/INR   Recent Labs   Lab Test 03/14/24  1122      POTASSIUM 4.0   CHLORIDE 98   CO2 25   *   BUN 31.4*   CR 1.08*   GFRESTIMATED 55*   JOY 11.6*     Recent Labs   Lab Test 03/14/24  1122 06/06/23  0909 06/08/22  0921   CR 1.08* 0.97* 0.97          No results for input(s): \"WBC\", \"HGB\", \"HCT\", \"MCV\", \"PLT\" in the last 00693 hours.  No results for input(s): \"HGB\" in the last 79664 hours. No results for input(s): \"TROPONINI\" in the last 23768 hours.  No results for input(s): \"BNP\", \"NTBNPI\", \"NTBNP\" in the last 62704 hours.  Recent Labs   Lab Test 03/14/24  1122   TSH 3.89     Recent Labs   Lab Test 03/14/24  1122   INR 1.10         Data Review    ECGs (tracings independently reviewed)  3/21/2024 - AF, ventricular rate 123bpm         Cc: Ankur Queen MD Amila Dilusha William, MD  3/21/2024  8:32 AM      "

## 2024-03-22 LAB
ATRIAL RATE - MUSE: 357 BPM
DIASTOLIC BLOOD PRESSURE - MUSE: NORMAL MMHG
INTERPRETATION ECG - MUSE: NORMAL
P AXIS - MUSE: NORMAL DEGREES
PR INTERVAL - MUSE: NORMAL MS
QRS DURATION - MUSE: 92 MS
QT - MUSE: 320 MS
QTC - MUSE: 458 MS
R AXIS - MUSE: 54 DEGREES
SYSTOLIC BLOOD PRESSURE - MUSE: NORMAL MMHG
T AXIS - MUSE: -50 DEGREES
VENTRICULAR RATE- MUSE: 123 BPM

## 2024-03-22 NOTE — PROGRESS NOTES
Called pt back and discussed the need to stop metoprolol when starting Sotalol.     Jennifer Lucero RN

## 2024-03-22 NOTE — PROGRESS NOTES
Returned call to pt and clarified start time for Sotalol to be Wednesday due to getting EKG on Friday. Answered many questions regarding CV, monitor, EKG, and echo.     Jennifer Lucero RN

## 2024-03-26 ENCOUNTER — PREP FOR PROCEDURE (OUTPATIENT)
Dept: CARDIOLOGY | Facility: CLINIC | Age: 69
End: 2024-03-26
Payer: COMMERCIAL

## 2024-03-26 DIAGNOSIS — I48.19 PERSISTENT ATRIAL FIBRILLATION (H): Primary | ICD-10-CM

## 2024-03-26 RX ORDER — LIDOCAINE 40 MG/G
CREAM TOPICAL
Status: CANCELLED | OUTPATIENT
Start: 2024-03-26

## 2024-03-27 ENCOUNTER — TELEPHONE (OUTPATIENT)
Dept: CARDIOLOGY | Facility: CLINIC | Age: 69
End: 2024-03-27
Payer: COMMERCIAL

## 2024-03-27 NOTE — TELEPHONE ENCOUNTER
Pre-Procedure Education    Procedure: DCCV with Jakob Villatoro NP on 4/4/2024 with arrival time 8:30 am      PT IS ON XARELTO AND NO MISSED DOSES TAKES AT BREAKFAST AND TOLD PT TAKING THE DAY OF CV WITH NO FOOD IS OK FOR 1 DAY AND IS AWARE TO CALL IF MISSES A DOSE    Orders: Orderset for procedure verified signed/held    COVID: COVID policy- if pt develops COVID like symptoms prior to procedure, he/she would need to complete an at home with a rapid antigen COVID test 1-2 days prior to your procedure date. If COVID + pt is aware the procedure will need to be rescheduled, and to contact CV scheduling as soon as possible    Pre-Op H&P: Completed- Available in Epic    Education:   PT HAS A  FOR PROCEDURE THAT WILL BE DROPPED OFF AND PICKED UP    PT HAS MY CHART SHE REVIEWED AND HAS NOT RECEIVED PROCEDURE LETTER YET    PT INSTRUCTED TO HOLD ANY VITAMINS, MINERALS CALCIUM IRON OR SUPPLEMENTS THE MORNING OF CV  PT INSTRUCTED NO GUM CHEWING MINTS OR CANDY THE MORNING OF CV  PT INSTRUCTED TO BATHE OR SHOWER BEFORE COMING IN  PT INSTRUCTED TO LEAVE JEWELRY AT HOME  PT IS ON XARELTO  PT IS ON SOTALOL  PT INSTRUCTED TO HOLD HER METFORMIN THIS MORNING OF CV  NO OTHER MEDICATION CHANGES WERE MADE  PT HAS A POST CV FOLLOW UP WITH JAKOB 5/3    Contact: Reviewed via phone with pt    Pre-Procedure Instruction: NPO after midnight pre procedure, Defined NPO with pt, Remove all jewelry and leave all valuables at home, Shower prior to arrival, Sedation plan/orders, Transportation requirements and arrangements post procedure, Post-procedure follow up process, Post-procedure restrictions/expectations, and Pre-procedure letter sent- letter tab  Risks:      Medication:   Instructions regarding anticoagulants: Xarelto- To continue anticoagulation uninterrupted through their procedure    Instructions regarding antiarrhythmic medication: Sotalol; continue medication prior to procedure as prescribed    Instructions given to pt regarding  diuretics medication: NA for DCCV    Instructions given to pt regarding DM/GLP-1 medication:   DM-  PT WILL HOLD HER METFORMIN THE MORNING OF CV  GLP-1- None  Instructions for medication, other than anticoagulants and antiarrhythmics listed above, given to pt: Take all medication AM of procedure with small sips of water     Important patient information for staff:  PT NEW ON SOTALOL    3/27/2024 12:44 PM  Alesia Pollock LPN

## 2024-03-29 ENCOUNTER — HOSPITAL ENCOUNTER (OUTPATIENT)
Dept: CARDIOLOGY | Facility: HOSPITAL | Age: 69
Discharge: HOME | End: 2024-03-29
Attending: INTERNAL MEDICINE
Payer: COMMERCIAL

## 2024-03-29 ENCOUNTER — ALLIED HEALTH/NURSE VISIT (OUTPATIENT)
Dept: CARDIOLOGY | Facility: CLINIC | Age: 69
End: 2024-03-29
Payer: COMMERCIAL

## 2024-03-29 VITALS
WEIGHT: 172 LBS | HEART RATE: 71 BPM | DIASTOLIC BLOOD PRESSURE: 86 MMHG | SYSTOLIC BLOOD PRESSURE: 120 MMHG | RESPIRATION RATE: 16 BRPM | OXYGEN SATURATION: 98 %

## 2024-03-29 DIAGNOSIS — I48.19 PERSISTENT ATRIAL FIBRILLATION (H): ICD-10-CM

## 2024-03-29 LAB
ATRIAL RATE - MUSE: 267 BPM
DIASTOLIC BLOOD PRESSURE - MUSE: NORMAL MMHG
INTERPRETATION ECG - MUSE: NORMAL
P AXIS - MUSE: NORMAL DEGREES
PR INTERVAL - MUSE: NORMAL MS
QRS DURATION - MUSE: 78 MS
QT - MUSE: 348 MS
QTC - MUSE: 489 MS
R AXIS - MUSE: 44 DEGREES
SYSTOLIC BLOOD PRESSURE - MUSE: NORMAL MMHG
T AXIS - MUSE: -81 DEGREES
VENTRICULAR RATE- MUSE: 119 BPM

## 2024-03-29 PROCEDURE — 93000 ELECTROCARDIOGRAM COMPLETE: CPT | Performed by: INTERNAL MEDICINE

## 2024-03-29 PROCEDURE — 999N000096 CARDIAC MOBILE TELEMETRY MONITOR

## 2024-03-29 PROCEDURE — 99207 PR NO CHARGE NURSE ONLY: CPT

## 2024-03-29 RX ORDER — HYDROCHLOROTHIAZIDE 25 MG/1
TABLET ORAL
COMMUNITY

## 2024-03-29 NOTE — PROGRESS NOTES
EKG Visit    Pt here for EKG, QTc check after starting 80mg Sotalol BID on 3/26/24    EKG shows AFL with HR of 119 bpm, QT of 348 ms, and QTc of 489 ms  QTc within acceptable limits, pts EKG was compared to previous EKG in EMR, EKG is stable, and there has been minimal change in QTc  Vital signes were reviewed and are within normal limits    Pt reports tolerating medication without complaint, reviewed with pt reasoning for above medication, reviewed and confirmed pt understands current dose, administration, and frequency of medication, most common potential side effects from the medication, s/s to call the clinic with, and when to seek emergency medical care    Pt was instructed to continue current medication as prescribed, results would be sent to ordering provider for review, pt will be contacted with further changes if necessary, and pt verbalized understanding      This pt is scheduled for DCCV next thurs 4/4. She stopped her metoprolol tartrate 25mg BID when she started her sotolol on 3/26/24.  She states she is feeling well, a little more sweaty than usual.    Results sent to  Dr Seals for further review and recommendations if necessary  3/29/2024 9:43 AM  Anjana Ashford RN

## 2024-04-04 ENCOUNTER — HOSPITAL ENCOUNTER (OUTPATIENT)
Dept: CARDIOLOGY | Facility: HOSPITAL | Age: 69
Discharge: HOME | End: 2024-04-04
Attending: INTERNAL MEDICINE
Payer: COMMERCIAL

## 2024-04-04 ENCOUNTER — HOSPITAL ENCOUNTER (OUTPATIENT)
Dept: CARDIOLOGY | Facility: HOSPITAL | Age: 69
Discharge: HOME | End: 2024-04-04
Attending: INTERNAL MEDICINE | Admitting: INTERNAL MEDICINE
Payer: COMMERCIAL

## 2024-04-04 VITALS
RESPIRATION RATE: 16 BRPM | OXYGEN SATURATION: 97 % | SYSTOLIC BLOOD PRESSURE: 138 MMHG | DIASTOLIC BLOOD PRESSURE: 77 MMHG | HEART RATE: 58 BPM | TEMPERATURE: 98.3 F

## 2024-04-04 DIAGNOSIS — I48.19 PERSISTENT ATRIAL FIBRILLATION (H): ICD-10-CM

## 2024-04-04 LAB
ATRIAL RATE - MUSE: 61 BPM
DIASTOLIC BLOOD PRESSURE - MUSE: NORMAL MMHG
INTERPRETATION ECG - MUSE: NORMAL
LVEF ECHO: NORMAL
P AXIS - MUSE: 62 DEGREES
PR INTERVAL - MUSE: 148 MS
QRS DURATION - MUSE: 84 MS
QT - MUSE: 480 MS
QTC - MUSE: 483 MS
R AXIS - MUSE: 41 DEGREES
SYSTOLIC BLOOD PRESSURE - MUSE: NORMAL MMHG
T AXIS - MUSE: 18 DEGREES
VENTRICULAR RATE- MUSE: 61 BPM

## 2024-04-04 PROCEDURE — 93010 ELECTROCARDIOGRAM REPORT: CPT | Performed by: INTERNAL MEDICINE

## 2024-04-04 PROCEDURE — 93306 TTE W/DOPPLER COMPLETE: CPT

## 2024-04-04 PROCEDURE — 99214 OFFICE O/P EST MOD 30 MIN: CPT | Performed by: NURSE PRACTITIONER

## 2024-04-04 PROCEDURE — 93306 TTE W/DOPPLER COMPLETE: CPT | Mod: 26 | Performed by: STUDENT IN AN ORGANIZED HEALTH CARE EDUCATION/TRAINING PROGRAM

## 2024-04-04 PROCEDURE — 93005 ELECTROCARDIOGRAM TRACING: CPT

## 2024-04-04 RX ORDER — LIDOCAINE 40 MG/G
CREAM TOPICAL
Status: DISCONTINUED | OUTPATIENT
Start: 2024-04-04 | End: 2024-04-04 | Stop reason: HOSPADM

## 2024-04-04 ASSESSMENT — ACTIVITIES OF DAILY LIVING (ADL)
ADLS_ACUITY_SCORE: 35
ADLS_ACUITY_SCORE: 35

## 2024-04-04 NOTE — PROCEDURES
Procedures      Cardioversion canceled.  Patient arrived in sinus rhythm.    EKG personally reviewed shows sinus rhythm at 61 bpm, occasional PACs, QRS 84 ms, QT/QTc 480/483 ms.  We discussed the physiology, natural progression, and treatment options of A-fib including medications and catheter ablation.  She was reassured that atrial fibrillation is not life-threatening, but carries an increased risk for stroke for which she is on Xarelto.  We discussed avoidance of possible triggers.  She was instructed to check her pulse daily and with possible symptoms; to keep a log of AF episodes including frequency, duration, and symptoms.  Continue wearing 30-day MCT for the duration of monitoring.    Continue current medications including sotalol 80 mg twice daily and Xarelto 20 mg daily, discontinue aspirin.    Discharge to home after echo completed.  Follow-up with me on 5/23/2024 as scheduled

## 2024-05-01 PROCEDURE — 93228 REMOTE 30 DAY ECG REV/REPORT: CPT | Performed by: INTERNAL MEDICINE

## 2024-05-11 ENCOUNTER — HEALTH MAINTENANCE LETTER (OUTPATIENT)
Age: 69
End: 2024-05-11

## 2024-05-23 ENCOUNTER — OFFICE VISIT (OUTPATIENT)
Dept: CARDIOLOGY | Facility: CLINIC | Age: 69
End: 2024-05-23
Payer: COMMERCIAL

## 2024-05-23 VITALS
SYSTOLIC BLOOD PRESSURE: 114 MMHG | RESPIRATION RATE: 14 BRPM | DIASTOLIC BLOOD PRESSURE: 76 MMHG | HEART RATE: 59 BPM | WEIGHT: 166 LBS

## 2024-05-23 DIAGNOSIS — I10 BENIGN ESSENTIAL HYPERTENSION: ICD-10-CM

## 2024-05-23 DIAGNOSIS — I48.19 PERSISTENT ATRIAL FIBRILLATION (H): Primary | ICD-10-CM

## 2024-05-23 PROBLEM — E11.9 TYPE 2 DIABETES MELLITUS (H): Status: ACTIVE | Noted: 2024-05-23

## 2024-05-23 PROCEDURE — 99215 OFFICE O/P EST HI 40 MIN: CPT | Performed by: NURSE PRACTITIONER

## 2024-05-23 PROCEDURE — G2211 COMPLEX E/M VISIT ADD ON: HCPCS | Performed by: NURSE PRACTITIONER

## 2024-05-23 PROCEDURE — 99417 PROLNG OP E/M EACH 15 MIN: CPT | Performed by: NURSE PRACTITIONER

## 2024-05-23 RX ORDER — SOTALOL HYDROCHLORIDE 80 MG/1
120 TABLET ORAL 2 TIMES DAILY
Qty: 270 TABLET | Refills: 3 | Status: SHIPPED | OUTPATIENT
Start: 2024-05-23

## 2024-05-23 NOTE — PATIENT INSTRUCTIONS
Cathy Randall,    It was a pleasure to see you today at the [facility].     My recommendations after this visit include:    Continue Xarelto 20 mg daily with FULL meal to ensure adequate absorption.    On Sunday, increase sotalol to 120 mg twice daily  EKG on Tuesday    Consider ablation to treat A fib (alternative to medication--sotalol)    Monitor blood pressure.  Call if lightheaded or blood pressure < 100/x.    Daily pulse check and Kardia reading, also with symptoms.  Keep a log of A fib episodes.    Follow up in 2 months    Elizabeth Villatoro CNP  [facility], Electrophysiology  [phone]  EP nurses [phone]     Information on Atrial Fibrillation Ablations    What is Catheter Ablation?  A Catheter Ablation is a procedure that treats certain types of abnormal heart rhythms (arrhythmia). There are several components to the procedure, but the final purpose is target and destroy (ablate) small areas of your heart muscle that are causing the arrhythmia.     Why is an Ablations Done?  A catheter ablation is an effective way to treat some types of abnormal heart rhythms. An ablation is a relatively low risk procedure that may permanently cure your abnormal heart rhythm.  The ablation process damages the heart cells which results in scarring of that area. The scar is electrically inactive and can produce a permanent cure for the abnormal rhythm.  Ablation procedures can help avoid the need for rhythm medications and give patients the ability to return to their normal activity and live an active life. In patients that do not have symptoms, ablations are not typically done as there may still be an increased risk of stroke.    Why is Catheter Ablation Done?  Sometimes, the heart s electrical system does not work properly.  This can cause abnormal heart rhythms, called arrhythmias.  During an arrhythmia, the heart may beat too fast, too slowly, or irregularly.  Your doctor  has recommended catheter ablation to treat a rapid (fast) heart rhythm, or tachycardia.      How Catheter Ablation Is Done  Catheter ablation uses thin, flexible wires called electrode catheters to find and destroy (ablate) problem cells.     Here is how the procedure is done:  The pulmonary veins will be treated first. There are currently two tools used to ablate around the pulmonary veins.  Radiofrequency catheter will heat the tissue.  Cryo-balloon catheter will freeze the tissue.   Testing will be done to confirm that effective treatment has been delivered.   Further testing may be performed to see if a fib is still present or if some other rhythm problem such as atrial flutter is present. If an ongoing rhythm problem is discovered then further ablation can be done to isolate and destroy those areas responsible for the arrhythmia.     Your  Experience during Catheter Ablation    In most cases, catheter ablations are done in an electrophysiology (EP) lab.  The procedural area: You will be transferred back to the procedure room once you have been appropriately prepped by the nursing staff and you are ready for your ablation.   Sedation: You to be put completely asleep for your ablation using general anesthesia.  Inserting the catheters: You will have 3-4 catheters inserted into the veins. Catheter locations can include the shoulder, neck, and groins. Catheters are guided to the heart with the help of ultrasound and x-ray monitors.  Finishing up: When the procedure is finished, the catheters are taken out of your body. A special closure device or suture may be used to help seal these puncture sites. You re then taken to your room to rest, and will be cared for by a nurse during your recovery.    Risks and Complications  The risks of catheter ablation are fairly low compared to the benefits you receive. Possible risks and complications include:  Common (up to 10%)  Bleeding or bruising  Shortness of  breath  Heartburn  Uncommon (< 1%)  Blood clots  A slow heart rhythm (requiring a permanent pacemaker)  Perforation of the heart muscle, blood vessel, or lung (may require an emergency procedure)  Stroke  Damage to a heart valve   Heart attack, also known as acute myocardial infarction, or AMI   Death (extremely rare)    Before your Catheter Ablation  Before your catheter ablation, you will meet with the Electrophysiologist (specially trained heart doctor) who will do the procedure. The provider or a registered nurse will provide you with detailed instructions on how to prepare for this procedure, some of these instructions are listed below.  You will likely be told to stop or change your heart rhythm medications for a period of time before your ablation.   You may have testing done several days prior to your ablation or the morning of your ablation, such as an ECG, x-ray, blood tests, or echocardiogram.   You will not be allowed to eat or drink 8 hours before your ablation. You will be given further instructions by your physician or a registered nurse regarding the medication you will take the morning of your procedure.  You will need to arrange to have a  home from the hospital; you will not be permitted to drive after your procedure due to the sedation that you receive.   You are allowed to bring personal items and clothing to the hospital, but please refrain from bringing any valuables as the hospital is not responsible for any lost or stolen items.  You will need to bring a list of the names and dosages of the medication you are taking to the hospital.  It is important to mention to your doctor or registered nurse if you have any allergies, reactions to anesthesia, or have had history of bleeding problems.    Arriving at the Hospital the morning of your Catheter Ablation  Please check in at the time that was given to you by the , who scheduled your procedure. You do not need to arrive any earlier  than the time that you were given.    When you arrive, you will be directed to the area where they will be performing your procedure. The doctor or registered nurse will meet with you prior to your ablation, this is a good time to ask questions and address any concerns you may have. You will then be asked to sign the consent form for your ablation, if this has not already been done.    The nursing staff will begin to prepare you for your procedure:  A nurse will shave and cleanse the area where the ablation catheters will be placed. These areas are most commonly the left and right groin sites (the fold between your thigh and abdomen), and in some cases the chest, arm, and neck. This is done to reduce the risk of infection.    The nursing staff will start an intravenous (IV) line into a vein in your arm, which allows the staff to give you medication and sedatives to help you relax prior and during your ablation.  In some cases, the nursing staff will need to place a catheter that will drain urine from your bladder (Alexandre Catheter), which is required due to the length and complexity of the ablation you are having.    After Catheter Ablation  Recovery immediately after your ablation in the hospital  After your catheter ablation procedure, you will be taken to a recovery room. After your ablation, you may be required to lay flat or be on bed rest. During this time, a nurse will monitor you, and you will be given medication to make you comfortable.     Going Home  When it is time to go home, your will need to have an adult family member or friend drive you. Most people can walk, climb stairs, and perform light activity soon after catheter ablation. You can most likely return to your full routine within a few days. However, you may be told to avoid running, heavy lifting, and other strenuous activities for a short time. Please make sure to follow any specific activity restrictions provided by the medical staff at the time  of your discharge from the hospital.  Doctor's typically advise that you not drive until your post procedure assessment visit.   Avoid heavy physical activity and heavy lifting for several days after the procedure to allow your body to heal.  Ask your doctor when you can expect to return to work.  Take your temperature and check your incision for signs of infection (redness, swelling, drainage, or warmth) every day for a week. It is normal to have a small bruise or lump where the catheter was inserted.  Take your medications exactly as directed. Do not skip doses or stop medication without consulting your physician prior.  Learn to take your own pulse and keep a record of your results.    Follow-Up  After your ablations you will have a follow up visit to see how you are doing, to assess your rhythm after your ablation, and to address any medication changes if necessary. In many cases, one ablation is enough to treat an arrhythmia. However, sometimes the problem returns or another is found. If this happens, you may need a second catheter ablation. Tell your healthcare provider if you have any new or returning symptoms.    Common Symptoms after Catheter Ablation  In the first few weeks after catheter ablation, you may feel mild chest fullness or aching. You may also feel as if your heart is skipping beats or your heartbeat may feel faster than normal. You may think that your heart rhythm problem is about to return. These sensations are normal and usually go away with time. Talk to your healthcare provider if you are concerned.      When to Call Your Doctor  Increased bleeding, bruising, or pain at the insertion site  Episodes of atrial fibrillation are common post procedure, call the clinic if episodes are lasting longer than 4-6 hours  Difficulty with your speech or walking, or any visual disturbance  Lightheaded, dizziness, or feeling faint  Shortness of breath or chest pain  Coldness, swelling, or numbness of the  arm or leg near the insertion site  A bruise or lump at the insertion site that is larger than a walnut  A fever over 100 F

## 2024-05-23 NOTE — LETTER
5/23/2024    Ankur Queen MD  [address] [place] [address]    RE: Cathy aRndall       Dear Colleague,     I had the [facility] of [facility].    HEART CARE ELECTROPHYSIOLOGY NOTE      [facility]  [phone]      Assessment/Recommendations   Assessment/Plan:  1.  Persistent Atrial Fibrillation: Incomplete suppression of AF with sotalol.  MCOT shows almost daily episodes of AF up to 17 hours.  Mildly symptomatic.  We reviewed the physiology and natural progression of atrial fibrillation and treatment options including rate control versus rhythm control with medications or pulmonary vein isolation ablation.  We discussed pulmonary vein isolation ablation procedure utilizing cryo or radiofrequency including the <1-2% risk for major complication, including but not limited to, stroke, myocardial or esophageal perforation, pulmonary vein stenosis, phrenic nerve injury, or death.  We also discussed the expected recovery and follow-up.  She was given information to review at home.  -- Use of pulse checks and Kardia device to monitor AF frequency and duration  -- On Sunday, increase sotalol to 120 mg twice daily  -- EKG on Tuesday for QT interval monitoring    She was reassured that atrial fibrillation is not life-threatening, but carries an increased risk for stroke.  She has a [id number] score of 4 for age 65-74, female gender, HTN, DM 2.    --Continue Xarelto 20 mg daily for stroke prophylaxis with full meal to ensure adequate absorption    2.  Hypertension: Controlled with hydrochlorothiazide and sotalol.  Monitor with increase in sotalol as above.  May need to decrease hydrochlorothiazide dose.    Follow up in 2 months       History of Present Illness/Subjective    HPI: Cathy Randall is a 68 year old female who comes in today for EP follow-up of atrial fibrillation.  He has a history of persistent atrial fibrillation,  hypertension, type 2 diabetes    Arrhythmia history  Dx/date: Atrial fibrillation diagnosed 3/14/2024, symptom onset approximately 1 month prior.  Converted to SR with initiation of sotalol.  Sx: Fatigue, mild dyspnea on exertion  [id number] score: 4 for age 65-74, female gender, HTN, DM 2  Oral anticoagulation: Xarelto 20 mg daily  Antiarrhythmic medications, AV pretty blocking agents: Sotalol 80 mg twice daily (3/21/2024 to present)  Procedures  DCCV: 4/4/2024--canceled, arrived in SR  Ablation: N/A    Cathy states that she feels very well with improved energy and no shortness of breath.  She is mildly aware of AF episodes.  She has started documenting her rhythm using her Kardia device, today showed sinus rhythm at 62 bpm.  She denies chest discomfort, palpitations, abdominal fullness/bloating or peripheral edema, shortness of breath, paroxysmal nocturnal dyspnea, orthopnea, lightheadedness, dizziness, pre-syncope, or syncope.    Cardiographics (EKG, MCOT, Kardia strip personally reviewed):  EKG done 4/4/2024 shows sinus rhythm at 61 bpm, PACs, QRS 84 ms, QT/QTc 480/483 ms.    Kardia strip from today shows sinus rhythm in the 60s    MCOT worn 3/29/2024 to 4/27/2024:  Predominant underlying rhythm was sinus rhythm, 46 to 159bpm, average 80 bpm.  Total atrial fibrillation and possible atrial flutter burden was 42%  There were no pauses noted.  Rare supraventricular ectopic beats (1%).  Rare premature ventricular contractions (2%).  Symptom triggers (1 event, other symptoms) correlated to atrial fibrillation.    ECHO done 4/4/2024:  The left ventricle is normal in size. There is normal left ventricular wall  thickness.  Left ventricular systolic function is normal. The visual ejection fraction is  55-60%. No regional wall motion abnormalities noted.     The right ventricle is normal in size and function.  Normal left atrial size. Right atrial size is normal.  There is mild (1+) mitral regurgitation.  Mild  (35-45mmHg) pulmonary hypertension is present.  IVC diameter <2.1 cm collapsing >50% with sniff suggests a normal RA pressure  of 3 mmHg.  There is no comparison study available.    I have reviewed and updated the patient's Past Medical History, Social History, Family History and Medication List.  Outside records personally reviewed.     Physical Examination  Review of Systems   Vitals: /76 (BP Location: Right arm, Patient Position: Sitting, Cuff Size: Adult Regular)   Pulse 59   Resp 14   Wt 75.3 kg (166 lb)   BMI= There is no height or weight on file to calculate BMI.  Wt Readings from Last 3 Encounters:   05/23/24 75.3 kg (166 lb)   03/29/24 78 kg (172 lb)   03/21/24 79.2 kg (174 lb 9.6 oz)       General Appearance:   Alert, well-appearing and in no acute distress.   HEENT: Atraumatic, normocephalic.  No scleral icterus, normal conjunctivae, EOMs intact, PERRL.  Mucous membranes pink and moist.     Chest/Lungs:   Chest symmetric, spine straight.  Respirations unlabored.  Lungs are clear to auscultation.   Cardiovascular:   Regular rate and rhythm.  Normal first and second heart sounds with no murmurs, rubs, or gallops; radial pulses are intact, No edema.   Abdomen:  Soft, nondistended.   Extremities: No cyanosis or clubbing.   Musculoskeletal: Moves all extremities.     Skin: Warm, dry, intact.    Neurologic: Mood and affect are appropriate.  Alert and oriented to person, place, time, and situation.     ROS: 10 point ROS neg other than the symptoms noted above in the HPI.         Medical History  Surgical History Family History Social History   Past Medical History:   Diagnosis Date    Benign essential hypertension 05/23/2024    Persistent atrial fibrillation (H) 03/21/2024    Type 2 diabetes mellitus (H) 05/23/2024     Past Surgical History:   Procedure Laterality Date    CATARACT EXTRACTION      WRIST FRACTURE SURGERY      Right 1990s, Left 2004     Family History   Problem Relation Age of Onset     Hypertension Mother     Diabetes Mother     Hypertension Father     Diabetes Father     Breast Cancer No family hx of         Social History     Socioeconomic History    Marital status: Single     Spouse name: Not on file    Number of children: Not on file    Years of education: Not on file    Highest education level: Not on file   Occupational History    Not on file   Tobacco Use    Smoking status: Never    Smokeless tobacco: Not on file   Substance and Sexual Activity    Alcohol use: Not on file    Drug use: Not on file    Sexual activity: Not on file   Other Topics Concern    Not on file   Social History Narrative    Not on file     Social Determinants of Health     Financial Resource Strain: Not on file   Food Insecurity: Not on file   Transportation Needs: Not on file   Physical Activity: Not on file   Stress: Not on file   Social Connections: Not on file   Interpersonal Safety: Not on file   Housing Stability: Not on file           Medications  Allergies   Current Outpatient Medications   Medication Sig Dispense Refill    amLODIPine-benazepril (LOTREL) 5-40 MG capsule Take 1 capsule by mouth daily      atorvastatin (LIPITOR) 10 MG tablet Take 1 tablet by mouth daily      Calcium Carbonate-Vitamin D 600-5 MG-MCG TABS 2 tablet with a meal Orally Once a day      hydrochlorothiazide (HYDRODIURIL) 25 MG tablet TAKE 1 TABLET BY MOUTH IN THE MORNING ONCE A DAY AS NEEDED FOR FLUID RETENTION      metFORMIN (GLUCOPHAGE XR) 500 MG 24 hr tablet Take 500 mg by mouth 2 times daily (with meals)      Multiple Vitamin (MULTIVITAMIN ADULT PO) Take 1 tablet by mouth daily      Multiple Vitamins-Minerals (PRESERVISION AREDS) TABS Take 1 tablet by mouth 2 times daily      sotalol (BETAPACE) 80 MG tablet Take 1.5 tablets (120 mg) by mouth 2 times daily 270 tablet 3    vitamin E (TOCOPHEROL) 400 units (180 mg) capsule Take 1 tablet by mouth daily      XARELTO ANTICOAGULANT 20 MG TABS tablet Take 20 mg by mouth daily        "  Allergies   Allergen Reactions    Aspirin Unknown     Doesn't take due to bleeding risk    Penicillins Unknown    Sulfa (Sulfonamide Antibiotics) [Sulfa Antibiotics] Unknown          Lab Results    Chemistry/lipid CBC Cardiac Enzymes/BNP/TSH/INR   Recent Labs   Lab Test 06/06/23  0909   CHOL 190   HDL 61   *   TRIG 124     Recent Labs   Lab Test 06/06/23  0909 06/08/22  0921 09/07/21  0846   * 106 111     Recent Labs   Lab Test 03/14/24  1122      POTASSIUM 4.0   CHLORIDE 98   CO2 25   *   BUN 31.4*   CR 1.08*   GFRESTIMATED 55*   JOY 11.6*     Recent Labs   Lab Test 03/14/24  1122 06/06/23  0909 06/08/22  0921   CR 1.08* 0.97* 0.97      No results for input(s): \"WBC\", \"HGB\", \"HCT\", \"MCV\", \"PLT\" in the last 01345 hours.  No results for input(s): \"HGB\" in the last 47874 hours. No results for input(s): \"TROPONINI\" in the last 36633 hours.  No results for input(s): \"BNP\", \"NTBNPI\", \"NTBNP\" in the last 34563 hours.  Recent Labs   Lab Test 03/14/24  1122   TSH 3.89     Recent Labs   Lab Test 03/14/24  1122   INR 1.10      59 minutes were spent on the date of encounter performing chart review, history and exam, documentation, and further activities as noted above.    The longitudinal plan of care for the diagnosis(es)/condition(s) as documented were addressed during this visit. Due to the added complexity in care, I will continue to support Cathy in the subsequent management and with ongoing continuity of care.            Thank you for allowing me to participate in the care of your patient.      Sincerely,     JAZZMINE Vega [facility]  cc:   Yuki Seals MD  [address]  [place] [address]      "

## 2024-05-23 NOTE — PROGRESS NOTES
HEART CARE ELECTROPHYSIOLOGY NOTE      [facility]  [phone]      Assessment/Recommendations   Assessment/Plan:  1.  Persistent Atrial Fibrillation: Incomplete suppression of AF with sotalol.  MCOT shows almost daily episodes of AF up to 17 hours.  Mildly symptomatic.  We reviewed the physiology and natural progression of atrial fibrillation and treatment options including rate control versus rhythm control with medications or pulmonary vein isolation ablation.  We discussed pulmonary vein isolation ablation procedure utilizing cryo or radiofrequency including the <1-2% risk for major complication, including but not limited to, stroke, myocardial or esophageal perforation, pulmonary vein stenosis, phrenic nerve injury, or death.  We also discussed the expected recovery and follow-up.  She was given information to review at home.  -- Use of pulse checks and Kardia device to monitor AF frequency and duration  -- On Sunday, increase sotalol to 120 mg twice daily  -- EKG on Tuesday for QT interval monitoring    She was reassured that atrial fibrillation is not life-threatening, but carries an increased risk for stroke.  She has a [id number] score of 4 for age 65-74, female gender, HTN, DM 2.    --Continue Xarelto 20 mg daily for stroke prophylaxis with full meal to ensure adequate absorption    2.  Hypertension: Controlled with hydrochlorothiazide and sotalol.  Monitor with increase in sotalol as above.  May need to decrease hydrochlorothiazide dose.    Follow up in 2 months       History of Present Illness/Subjective    HPI: Cathy Randall is a 68 year old female who comes in today for EP follow-up of atrial fibrillation.  He has a history of persistent atrial fibrillation, hypertension, type 2 diabetes    Arrhythmia history  Dx/date: Atrial fibrillation diagnosed 3/14/2024, symptom onset approximately 1 month prior.  Converted to SR with initiation of sotalol.  Sx: Fatigue, mild dyspnea  on exertion  [id number] score: 4 for age 65-74, female gender, HTN, DM 2  Oral anticoagulation: Xarelto 20 mg daily  Antiarrhythmic medications, AV pretty blocking agents: Sotalol 80 mg twice daily (3/21/2024 to present)  Procedures  DCCV: 4/4/2024--canceled, arrived in SR  Ablation: N/A    Cathy states that she feels very well with improved energy and no shortness of breath.  She is mildly aware of AF episodes.  She has started documenting her rhythm using her Kardia device, today showed sinus rhythm at 62 bpm.  She denies chest discomfort, palpitations, abdominal fullness/bloating or peripheral edema, shortness of breath, paroxysmal nocturnal dyspnea, orthopnea, lightheadedness, dizziness, pre-syncope, or syncope.    Cardiographics (EKG, MCOT, Kardia strip personally reviewed):  EKG done 4/4/2024 shows sinus rhythm at 61 bpm, PACs, QRS 84 ms, QT/QTc 480/483 ms.    Kardia strip from today shows sinus rhythm in the 60s    MCOT worn 3/29/2024 to 4/27/2024:  Predominant underlying rhythm was sinus rhythm, 46 to 159bpm, average 80 bpm.  Total atrial fibrillation and possible atrial flutter burden was 42%  There were no pauses noted.  Rare supraventricular ectopic beats (1%).  Rare premature ventricular contractions (2%).  Symptom triggers (1 event, other symptoms) correlated to atrial fibrillation.    ECHO done 4/4/2024:  The left ventricle is normal in size. There is normal left ventricular wall  thickness.  Left ventricular systolic function is normal. The visual ejection fraction is  55-60%. No regional wall motion abnormalities noted.     The right ventricle is normal in size and function.  Normal left atrial size. Right atrial size is normal.  There is mild (1+) mitral regurgitation.  Mild (35-45mmHg) pulmonary hypertension is present.  IVC diameter <2.1 cm collapsing >50% with sniff suggests a normal RA pressure  of 3 mmHg.  There is no comparison study available.    I have reviewed and updated the patient's  Past Medical History, Social History, Family History and Medication List.  Outside records personally reviewed.     Physical Examination  Review of Systems   Vitals: /76 (BP Location: Right arm, Patient Position: Sitting, Cuff Size: Adult Regular)   Pulse 59   Resp 14   Wt 75.3 kg (166 lb)   BMI= There is no height or weight on file to calculate BMI.  Wt Readings from Last 3 Encounters:   05/23/24 75.3 kg (166 lb)   03/29/24 78 kg (172 lb)   03/21/24 79.2 kg (174 lb 9.6 oz)       General Appearance:   Alert, well-appearing and in no acute distress.   HEENT: Atraumatic, normocephalic.  No scleral icterus, normal conjunctivae, EOMs intact, PERRL.  Mucous membranes pink and moist.     Chest/Lungs:   Chest symmetric, spine straight.  Respirations unlabored.  Lungs are clear to auscultation.   Cardiovascular:   Regular rate and rhythm.  Normal first and second heart sounds with no murmurs, rubs, or gallops; radial pulses are intact, No edema.   Abdomen:  Soft, nondistended.   Extremities: No cyanosis or clubbing.   Musculoskeletal: Moves all extremities.     Skin: Warm, dry, intact.    Neurologic: Mood and affect are appropriate.  Alert and oriented to person, place, time, and situation.     ROS: 10 point ROS neg other than the symptoms noted above in the HPI.         Medical History  Surgical History Family History Social History   Past Medical History:   Diagnosis Date    Benign essential hypertension 05/23/2024    Persistent atrial fibrillation (H) 03/21/2024    Type 2 diabetes mellitus (H) 05/23/2024     Past Surgical History:   Procedure Laterality Date    CATARACT EXTRACTION      WRIST FRACTURE SURGERY      Right 1990s, Left 2004     Family History   Problem Relation Age of Onset    Hypertension Mother     Diabetes Mother     Hypertension Father     Diabetes Father     Breast Cancer No family hx of         Social History     Socioeconomic History    Marital status: Single     Spouse name: Not on file     Number of children: Not on file    Years of education: Not on file    Highest education level: Not on file   Occupational History    Not on file   Tobacco Use    Smoking status: Never    Smokeless tobacco: Not on file   Substance and Sexual Activity    Alcohol use: Not on file    Drug use: Not on file    Sexual activity: Not on file   Other Topics Concern    Not on file   Social History Narrative    Not on file     Social Determinants of Health     Financial Resource Strain: Not on file   Food Insecurity: Not on file   Transportation Needs: Not on file   Physical Activity: Not on file   Stress: Not on file   Social Connections: Not on file   Interpersonal Safety: Not on file   Housing Stability: Not on file           Medications  Allergies   Current Outpatient Medications   Medication Sig Dispense Refill    amLODIPine-benazepril (LOTREL) 5-40 MG capsule Take 1 capsule by mouth daily      atorvastatin (LIPITOR) 10 MG tablet Take 1 tablet by mouth daily      Calcium Carbonate-Vitamin D 600-5 MG-MCG TABS 2 tablet with a meal Orally Once a day      hydrochlorothiazide (HYDRODIURIL) 25 MG tablet TAKE 1 TABLET BY MOUTH IN THE MORNING ONCE A DAY AS NEEDED FOR FLUID RETENTION      metFORMIN (GLUCOPHAGE XR) 500 MG 24 hr tablet Take 500 mg by mouth 2 times daily (with meals)      Multiple Vitamin (MULTIVITAMIN ADULT PO) Take 1 tablet by mouth daily      Multiple Vitamins-Minerals (PRESERVISION AREDS) TABS Take 1 tablet by mouth 2 times daily      sotalol (BETAPACE) 80 MG tablet Take 1.5 tablets (120 mg) by mouth 2 times daily 270 tablet 3    vitamin E (TOCOPHEROL) 400 units (180 mg) capsule Take 1 tablet by mouth daily      XARELTO ANTICOAGULANT 20 MG TABS tablet Take 20 mg by mouth daily         Allergies   Allergen Reactions    Aspirin Unknown     Doesn't take due to bleeding risk    Penicillins Unknown    Sulfa (Sulfonamide Antibiotics) [Sulfa Antibiotics] Unknown          Lab Results    Chemistry/lipid CBC Cardiac  "Enzymes/BNP/TSH/INR   Recent Labs   Lab Test 06/06/23  0909   CHOL 190   HDL 61   *   TRIG 124     Recent Labs   Lab Test 06/06/23  0909 06/08/22  0921 09/07/21  0846   * 106 111     Recent Labs   Lab Test 03/14/24  1122      POTASSIUM 4.0   CHLORIDE 98   CO2 25   *   BUN 31.4*   CR 1.08*   GFRESTIMATED 55*   JOY 11.6*     Recent Labs   Lab Test 03/14/24  1122 06/06/23  0909 06/08/22  0921   CR 1.08* 0.97* 0.97      No results for input(s): \"WBC\", \"HGB\", \"HCT\", \"MCV\", \"PLT\" in the last 04175 hours.  No results for input(s): \"HGB\" in the last 65494 hours. No results for input(s): \"TROPONINI\" in the last 16065 hours.  No results for input(s): \"BNP\", \"NTBNPI\", \"NTBNP\" in the last 05989 hours.  Recent Labs   Lab Test 03/14/24  1122   TSH 3.89     Recent Labs   Lab Test 03/14/24  1122   INR 1.10      59 minutes were spent on the date of encounter performing chart review, history and exam, documentation, and further activities as noted above.    The longitudinal plan of care for the diagnosis(es)/condition(s) as documented were addressed during this visit. Due to the added complexity in care, I will continue to support Cathy in the subsequent management and with ongoing continuity of care.                                        "

## 2024-05-28 ENCOUNTER — ALLIED HEALTH/NURSE VISIT (OUTPATIENT)
Dept: CARDIOLOGY | Facility: CLINIC | Age: 69
End: 2024-05-28
Payer: COMMERCIAL

## 2024-05-28 VITALS
RESPIRATION RATE: 16 BRPM | BODY MASS INDEX: 28.65 KG/M2 | SYSTOLIC BLOOD PRESSURE: 124 MMHG | HEART RATE: 57 BPM | DIASTOLIC BLOOD PRESSURE: 80 MMHG | HEIGHT: 64 IN | WEIGHT: 167.8 LBS

## 2024-05-28 DIAGNOSIS — I48.19 PERSISTENT ATRIAL FIBRILLATION (H): ICD-10-CM

## 2024-05-28 PROCEDURE — 93000 ELECTROCARDIOGRAM COMPLETE: CPT | Performed by: STUDENT IN AN ORGANIZED HEALTH CARE EDUCATION/TRAINING PROGRAM

## 2024-05-28 PROCEDURE — 99207 PR NO CHARGE NURSE ONLY: CPT

## 2024-05-28 RX ORDER — BLOOD SUGAR DIAGNOSTIC
STRIP MISCELLANEOUS DAILY
COMMUNITY

## 2024-05-28 NOTE — NURSING NOTE
[facility] Intake Information for EKG    Reason for EKG: Started Sotalol 120 mg BID on 5/26/2024  Verification of Anticoagulation/Medication: Medication list current below  Reported symptoms/concerns on intake/form: Pt reporting concerns/symptoms of tiredness   Next planned Follow up: Pt has apt with dar  on 7/25/2024  Communication: Pt was advised per EP RN results are WNL, and would be contacted if further changes were needed upon provider review. Results routed to EP RN.    Huong Cavazos MA      Current Outpatient Medications:     amLODIPine-benazepril (LOTREL) 5-40 MG capsule, Take 1 capsule by mouth daily, Disp: , Rfl:     atorvastatin (LIPITOR) 10 MG tablet, Take 1 tablet by mouth daily, Disp: , Rfl:     Calcium Carbonate-Vitamin D 600-5 MG-MCG TABS, 2 tablet with a meal Orally Once a day, Disp: , Rfl:     hydrochlorothiazide (HYDRODIURIL) 25 MG tablet, TAKE 1 TABLET BY MOUTH IN THE MORNING ONCE A DAY AS NEEDED FOR FLUID RETENTION, Disp: , Rfl:     metFORMIN (GLUCOPHAGE XR) 500 MG 24 hr tablet, Take 500 mg by mouth 2 times daily (with meals), Disp: , Rfl:     Multiple Vitamin (MULTIVITAMIN ADULT PO), Take 1 tablet by mouth daily, Disp: , Rfl:     Multiple Vitamins-Minerals (PRESERVISION AREDS) TABS, Take 1 tablet by mouth 2 times daily, Disp: , Rfl:     ONETOUCH VERIO IQ test strip, by In Vitro route daily, Disp: , Rfl:     sotalol (BETAPACE) 80 MG tablet, Take 1.5 tablets (120 mg) by mouth 2 times daily, Disp: 270 tablet, Rfl: 3    vitamin E (TOCOPHEROL) 400 units (180 mg) capsule, Take 1 tablet by mouth daily, Disp: , Rfl:     XARELTO ANTICOAGULANT 20 MG TABS tablet, Take 20 mg by mouth daily, Disp: , Rfl:   Allergies   Allergen Reactions    Aspirin Unknown     Doesn't take due to bleeding risk    Penicillins Unknown    Sulfa (Sulfonamide Antibiotics) [Sulfa Antibiotics] Unknown

## 2024-05-29 LAB
ATRIAL RATE - MUSE: 57 BPM
DIASTOLIC BLOOD PRESSURE - MUSE: NORMAL MMHG
INTERPRETATION ECG - MUSE: NORMAL
P AXIS - MUSE: 30 DEGREES
PR INTERVAL - MUSE: 112 MS
QRS DURATION - MUSE: 92 MS
QT - MUSE: 470 MS
QTC - MUSE: 457 MS
R AXIS - MUSE: 34 DEGREES
SYSTOLIC BLOOD PRESSURE - MUSE: NORMAL MMHG
T AXIS - MUSE: 11 DEGREES
VENTRICULAR RATE- MUSE: 57 BPM

## 2024-06-18 ENCOUNTER — LAB REQUISITION (OUTPATIENT)
Dept: LAB | Facility: CLINIC | Age: 69
End: 2024-06-18

## 2024-06-18 DIAGNOSIS — E11.9 TYPE 2 DIABETES MELLITUS WITHOUT COMPLICATIONS (H): ICD-10-CM

## 2024-06-18 DIAGNOSIS — E78.5 HYPERLIPIDEMIA, UNSPECIFIED: ICD-10-CM

## 2024-06-18 DIAGNOSIS — I10 ESSENTIAL (PRIMARY) HYPERTENSION: ICD-10-CM

## 2024-06-18 LAB
ALBUMIN SERPL BCG-MCNC: 4.4 G/DL (ref 3.5–5.2)
ALP SERPL-CCNC: 49 U/L (ref 40–150)
ALT SERPL W P-5'-P-CCNC: 30 U/L (ref 0–50)
ANION GAP SERPL CALCULATED.3IONS-SCNC: 13 MMOL/L (ref 7–15)
AST SERPL W P-5'-P-CCNC: 22 U/L (ref 0–45)
BILIRUB SERPL-MCNC: 0.9 MG/DL
BUN SERPL-MCNC: 29.2 MG/DL (ref 8–23)
CALCIUM SERPL-MCNC: 10.2 MG/DL (ref 8.8–10.2)
CHLORIDE SERPL-SCNC: 101 MMOL/L (ref 98–107)
CHOLEST SERPL-MCNC: 132 MG/DL
CREAT SERPL-MCNC: 0.93 MG/DL (ref 0.51–0.95)
DEPRECATED HCO3 PLAS-SCNC: 24 MMOL/L (ref 22–29)
EGFRCR SERPLBLD CKD-EPI 2021: 66 ML/MIN/1.73M2
FASTING STATUS PATIENT QL REPORTED: ABNORMAL
FASTING STATUS PATIENT QL REPORTED: ABNORMAL
GLUCOSE SERPL-MCNC: 156 MG/DL (ref 70–99)
HDLC SERPL-MCNC: 48 MG/DL
LDLC SERPL CALC-MCNC: 53 MG/DL
NONHDLC SERPL-MCNC: 84 MG/DL
POTASSIUM SERPL-SCNC: 3.8 MMOL/L (ref 3.4–5.3)
PROT SERPL-MCNC: 7.4 G/DL (ref 6.4–8.3)
SODIUM SERPL-SCNC: 138 MMOL/L (ref 135–145)
TRIGL SERPL-MCNC: 156 MG/DL

## 2024-06-18 PROCEDURE — 82570 ASSAY OF URINE CREATININE: CPT | Performed by: FAMILY MEDICINE

## 2024-06-18 PROCEDURE — 80053 COMPREHEN METABOLIC PANEL: CPT | Performed by: FAMILY MEDICINE

## 2024-06-18 PROCEDURE — 80061 LIPID PANEL: CPT | Performed by: FAMILY MEDICINE

## 2024-06-19 LAB
CREAT UR-MCNC: 44.5 MG/DL
MICROALBUMIN UR-MCNC: <12 MG/L
MICROALBUMIN/CREAT UR: NORMAL MG/G{CREAT}

## 2024-07-20 ENCOUNTER — HEALTH MAINTENANCE LETTER (OUTPATIENT)
Age: 69
End: 2024-07-20

## 2024-07-25 ENCOUNTER — OFFICE VISIT (OUTPATIENT)
Dept: CARDIOLOGY | Facility: CLINIC | Age: 69
End: 2024-07-25
Payer: COMMERCIAL

## 2024-07-25 VITALS
BODY MASS INDEX: 28.51 KG/M2 | HEIGHT: 64 IN | RESPIRATION RATE: 15 BRPM | DIASTOLIC BLOOD PRESSURE: 74 MMHG | WEIGHT: 167 LBS | HEART RATE: 58 BPM | SYSTOLIC BLOOD PRESSURE: 120 MMHG

## 2024-07-25 DIAGNOSIS — I48.19 PERSISTENT ATRIAL FIBRILLATION (H): Primary | ICD-10-CM

## 2024-07-25 DIAGNOSIS — I10 BENIGN ESSENTIAL HYPERTENSION: ICD-10-CM

## 2024-07-25 PROCEDURE — G2211 COMPLEX E/M VISIT ADD ON: HCPCS | Performed by: NURSE PRACTITIONER

## 2024-07-25 PROCEDURE — 99214 OFFICE O/P EST MOD 30 MIN: CPT | Performed by: NURSE PRACTITIONER

## 2024-07-25 NOTE — PROGRESS NOTES
HEART CARE ELECTROPHYSIOLOGY NOTE      [facility]  [phone]      Assessment/Recommendations   Assessment/Plan:  1.  Persistent Atrial Fibrillation: AF has decreased in frequency and duration with the increase in sotalol.  We reviewed treatment options including rate control versus rhythm control with medications or catheter ablation.  Recommend catheter ablation.  She is not yet ready to pursue ablation, but may in the future.  Reviewed avoidance of possible triggers.  -- Use of pulse checks and Kardia device to monitor AF frequency and duration  -- Continue sotalol 120 mg twice daily  -- Consideration for catheter ablation of A-fib    She was reassured that atrial fibrillation is not life-threatening, but carries an increased risk for stroke.  She has a [id number] score of 4 for age 65-74, female gender, HTN, DM 2.    --Continue Xarelto 20 mg daily for stroke prophylaxis with full meal to ensure adequate absorption    2.  Hypertension: Controlled with hydrochlorothiazide and sotalol.      Follow up in 6 months       History of Present Illness/Subjective    HPI: Cathy Randall is a 68 year old female who comes in today for EP follow-up of atrial fibrillation.  He has a history of persistent atrial fibrillation, hypertension, type 2 diabetes    Arrhythmia history  Dx/date: Atrial fibrillation diagnosed 3/14/2024, symptom onset approximately 1 month prior.  Converted to SR with initiation of sotalol.  Sx: Fatigue, diaphoresis, weakness, mild dyspnea on exertion  [id number] score: 4 for age 65-74, female gender, HTN, DM 2  Oral anticoagulation: Xarelto 20 mg daily  Antiarrhythmic medications, AV pretty blocking agents: Sotalol 80 mg twice daily (3/21/2024, increased to 120 mg twice daily 5/23/2024)  Procedures  DCCV: 4/4/2024--canceled, arrived in SR  Ablation: N/A    Cathy states that she has been feeling very well.  She has been tracking her heart rate and rhythm using her Kardia  device which has primarily showed sinus rhythm in the 60s.  A-fib with RVR in the 110s-120 bpm 3-4 times per month, lasting 1 to 2 hours associated with mild symptoms.  She denies chest discomfort, palpitations, abdominal fullness/bloating or peripheral edema, shortness of breath, paroxysmal nocturnal dyspnea, orthopnea, lightheadedness, dizziness, pre-syncope, or syncope.    Cardiographics (EKG, MCOT, Kardia strip personally reviewed):  EKG done 5/28/2024 shows sinus rhythm at 57 bpm, QRS 92 ms, QT/QTc 470/457 ms  EKG done 4/4/2024 shows sinus rhythm at 61 bpm, PACs, QRS 84 ms, QT/QTc 480/483 ms.    Kardia strips May-July show primarily SR 60s.  Episodes of AF-RVR (VR 110s-120) occurring 3-4 times per month.    MCOT worn 3/29/2024 to 4/27/2024:  Predominant underlying rhythm was sinus rhythm, 46 to 159bpm, average 80 bpm.  Total atrial fibrillation and possible atrial flutter burden was 42%  There were no pauses noted.  Rare supraventricular ectopic beats (1%).  Rare premature ventricular contractions (2%).  Symptom triggers (1 event, other symptoms) correlated to atrial fibrillation.    ECHO done 4/4/2024:  The left ventricle is normal in size. There is normal left ventricular wall  thickness.  Left ventricular systolic function is normal. The visual ejection fraction is  55-60%. No regional wall motion abnormalities noted.     The right ventricle is normal in size and function.  Normal left atrial size. Right atrial size is normal.  There is mild (1+) mitral regurgitation.  Mild (35-45mmHg) pulmonary hypertension is present.  IVC diameter <2.1 cm collapsing >50% with sniff suggests a normal RA pressure  of 3 mmHg.  There is no comparison study available.    I have reviewed and updated the patient's Past Medical History, Social History, Family History and Medication List.  Outside records personally reviewed.     Physical Examination  Review of Systems   Vitals: /74 (BP Location: Left arm, Patient Position:  "Sitting, Cuff Size: Adult Large)   Pulse 58   Resp 15   Ht 1.626 m (5' 4\")   Wt 75.8 kg (167 lb)   BMI 28.67 kg/m    BMI= Body mass index is 28.67 kg/m .  Wt Readings from Last 3 Encounters:   07/25/24 75.8 kg (167 lb)   05/28/24 76.1 kg (167 lb 12.8 oz)   05/23/24 75.3 kg (166 lb)       General Appearance:   Alert, well-appearing and in no acute distress.   HEENT: Atraumatic, normocephalic.  No scleral icterus, normal conjunctivae, EOMs intact, PERRL.  Mucous membranes pink and moist.     Chest/Lungs:   Chest symmetric, spine straight.  Respirations unlabored.  Lungs are clear to auscultation.   Cardiovascular:   Regular rate and rhythm.  Normal first and second heart sounds with no murmurs, rubs, or gallops; radial pulses are intact, No edema.   Abdomen:  Soft, nondistended.   Extremities: No cyanosis or clubbing.   Musculoskeletal: Moves all extremities.     Skin: Warm, dry, intact.    Neurologic: Mood and affect are appropriate.  Alert and oriented to person, place, time, and situation.     ROS: 10 point ROS neg other than the symptoms noted above in the HPI.         Medical History  Surgical History Family History Social History   Past Medical History:   Diagnosis Date    Benign essential hypertension 05/23/2024    Persistent atrial fibrillation (H) 03/21/2024    Type 2 diabetes mellitus (H) 05/23/2024     Past Surgical History:   Procedure Laterality Date    CATARACT EXTRACTION      WRIST FRACTURE SURGERY      Right 1990s, Left 2004     Family History   Problem Relation Age of Onset    Hypertension Mother     Diabetes Mother     Hypertension Father     Diabetes Father     Breast Cancer No family hx of         Social History     Socioeconomic History    Marital status: Single     Spouse name: Not on file    Number of children: Not on file    Years of education: Not on file    Highest education level: Not on file   Occupational History    Not on file   Tobacco Use    Smoking status: Never    Smokeless " tobacco: Not on file   Substance and Sexual Activity    Alcohol use: Not on file    Drug use: Not on file    Sexual activity: Not on file   Other Topics Concern    Not on file   Social History Narrative    Not on file     Social Determinants of Health     Financial Resource Strain: Not on file   Food Insecurity: Not on file   Transportation Needs: Not on file   Physical Activity: Not on file   Stress: Not on file   Social Connections: Not on file   Interpersonal Safety: Not on file   Housing Stability: Not on file           Medications  Allergies   Current Outpatient Medications   Medication Sig Dispense Refill    amLODIPine-benazepril (LOTREL) 5-40 MG capsule Take 1 capsule by mouth daily      atorvastatin (LIPITOR) 10 MG tablet Take 1 tablet by mouth daily      Calcium Carbonate-Vitamin D 600-5 MG-MCG TABS 2 tablet with a meal Orally Once a day      hydrochlorothiazide (HYDRODIURIL) 25 MG tablet TAKE 1 TABLET BY MOUTH IN THE MORNING ONCE A DAY AS NEEDED FOR FLUID RETENTION      metFORMIN (GLUCOPHAGE XR) 500 MG 24 hr tablet Take 500 mg by mouth 2 times daily (with meals)      Multiple Vitamin (MULTIVITAMIN ADULT PO) Take 1 tablet by mouth daily      Multiple Vitamins-Minerals (PRESERVISION AREDS) TABS Take 1 tablet by mouth 2 times daily      ONETOUCH VERIO IQ test strip by In Vitro route daily      sotalol (BETAPACE) 80 MG tablet Take 1.5 tablets (120 mg) by mouth 2 times daily 270 tablet 3    vitamin E (TOCOPHEROL) 400 units (180 mg) capsule Take 1 tablet by mouth daily      XARELTO ANTICOAGULANT 20 MG TABS tablet Take 20 mg by mouth daily         Allergies   Allergen Reactions    Aspirin Unknown     Doesn't take due to bleeding risk    Penicillins Unknown    Sulfa (Sulfonamide Antibiotics) [Sulfa Antibiotics] Unknown          Lab Results    Chemistry/lipid CBC Cardiac Enzymes/BNP/TSH/INR   Recent Labs   Lab Test 06/18/24  1034 06/06/23  0909   CHOL 132 190   HDL 48* 61   LDL 53 104*   TRIG 156* 124     Recent  "Labs   Lab Test 06/18/24  1034 03/14/24  1122    139   POTASSIUM 3.8 4.0   CHLORIDE 101 98   CO2 24 25   ANIONGAP 13 16*   * 148*   BUN 29.2* 31.4*   CR 0.93 1.08*   JOY 10.2 11.6*          CBC RESULTS: No results for input(s): \"WBC\", \"RBC\", \"HGB\", \"HCT\", \"MCV\", \"MCH\", \"MCHC\", \"RDW\", \"PLT\" in the last 05849 hours.   TSH   Date Value Ref Range Status   03/14/2024 3.89 0.30 - 4.20 uIU/mL Final              The longitudinal plan of care for the diagnosis(es)/condition(s) as documented were addressed during this visit. Due to the added complexity in care, I will continue to support Cathy in the subsequent management and with ongoing continuity of care.                                        "

## 2024-07-25 NOTE — LETTER
7/25/2024    Ankur Queen MD  [address] [place] [address]    RE: Cathy Randall       Dear Colleague,     I had the pleasure of seeing Cathy Randall in the [facility].    HEART CARE ELECTROPHYSIOLOGY NOTE      [facility]  [phone]      Assessment/Recommendations   Assessment/Plan:  1.  Persistent Atrial Fibrillation: AF has decreased in frequency and duration with the increase in sotalol.  We reviewed treatment options including rate control versus rhythm control with medications or catheter ablation.  Recommend catheter ablation.  She is not yet ready to pursue ablation, but may in the future.  Reviewed avoidance of possible triggers.  -- Use of pulse checks and Kardia device to monitor AF frequency and duration  -- Continue sotalol 120 mg twice daily  -- Consideration for catheter ablation of A-fib    She was reassured that atrial fibrillation is not life-threatening, but carries an increased risk for stroke.  She has a [id number] score of 4 for age 65-74, female gender, HTN, DM 2.    --Continue Xarelto 20 mg daily for stroke prophylaxis with full meal to ensure adequate absorption    2.  Hypertension: Controlled with hydrochlorothiazide and sotalol.      Follow up in 6 months       History of Present Illness/Subjective    HPI: Cathy Randall is a 68 year old female who comes in today for EP follow-up of atrial fibrillation.  He has a history of persistent atrial fibrillation, hypertension, type 2 diabetes    Arrhythmia history  Dx/date: Atrial fibrillation diagnosed 3/14/2024, symptom onset approximately 1 month prior.  Converted to SR with initiation of sotalol.  Sx: Fatigue, diaphoresis, weakness, mild dyspnea on exertion  [id number] score: 4 for age 65-74, female gender, HTN, DM 2  Oral anticoagulation: Xarelto 20 mg daily  Antiarrhythmic medications, AV pretty blocking agents: Sotalol 80 mg twice daily (3/21/2024, increased to 120 mg twice  daily 5/23/2024)  Procedures  DCCV: 4/4/2024--canceled, arrived in SR  Ablation: N/A    Cathy states that she has been feeling very well.  She has been tracking her heart rate and rhythm using her Kardia device which has primarily showed sinus rhythm in the 60s.  A-fib with RVR in the 110s-120 bpm 3-4 times per month, lasting 1 to 2 hours associated with mild symptoms.  She denies chest discomfort, palpitations, abdominal fullness/bloating or peripheral edema, shortness of breath, paroxysmal nocturnal dyspnea, orthopnea, lightheadedness, dizziness, pre-syncope, or syncope.    Cardiographics (EKG, MCOT, Kardia strip personally reviewed):  EKG done 5/28/2024 shows sinus rhythm at 57 bpm, QRS 92 ms, QT/QTc 470/457 ms  EKG done 4/4/2024 shows sinus rhythm at 61 bpm, PACs, QRS 84 ms, QT/QTc 480/483 ms.    Kardia strips May-July show primarily SR 60s.  Episodes of AF-RVR (VR 110s-120) occurring 3-4 times per month.    MCOT worn 3/29/2024 to 4/27/2024:  Predominant underlying rhythm was sinus rhythm, 46 to 159bpm, average 80 bpm.  Total atrial fibrillation and possible atrial flutter burden was 42%  There were no pauses noted.  Rare supraventricular ectopic beats (1%).  Rare premature ventricular contractions (2%).  Symptom triggers (1 event, other symptoms) correlated to atrial fibrillation.    ECHO done 4/4/2024:  The left ventricle is normal in size. There is normal left ventricular wall  thickness.  Left ventricular systolic function is normal. The visual ejection fraction is  55-60%. No regional wall motion abnormalities noted.     The right ventricle is normal in size and function.  Normal left atrial size. Right atrial size is normal.  There is mild (1+) mitral regurgitation.  Mild (35-45mmHg) pulmonary hypertension is present.  IVC diameter <2.1 cm collapsing >50% with sniff suggests a normal RA pressure  of 3 mmHg.  There is no comparison study available.    I have reviewed and updated the patient's Past  "Medical History, Social History, Family History and Medication List.  Outside records personally reviewed.     Physical Examination  Review of Systems   Vitals: /74 (BP Location: Left arm, Patient Position: Sitting, Cuff Size: Adult Large)   Pulse 58   Resp 15   Ht 1.626 m (5' 4\")   Wt 75.8 kg (167 lb)   BMI 28.67 kg/m    BMI= Body mass index is 28.67 kg/m .  Wt Readings from Last 3 Encounters:   07/25/24 75.8 kg (167 lb)   05/28/24 76.1 kg (167 lb 12.8 oz)   05/23/24 75.3 kg (166 lb)       General Appearance:   Alert, well-appearing and in no acute distress.   HEENT: Atraumatic, normocephalic.  No scleral icterus, normal conjunctivae, EOMs intact, PERRL.  Mucous membranes pink and moist.     Chest/Lungs:   Chest symmetric, spine straight.  Respirations unlabored.  Lungs are clear to auscultation.   Cardiovascular:   Regular rate and rhythm.  Normal first and second heart sounds with no murmurs, rubs, or gallops; radial pulses are intact, No edema.   Abdomen:  Soft, nondistended.   Extremities: No cyanosis or clubbing.   Musculoskeletal: Moves all extremities.     Skin: Warm, dry, intact.    Neurologic: Mood and affect are appropriate.  Alert and oriented to person, place, time, and situation.     ROS: 10 point ROS neg other than the symptoms noted above in the HPI.         Medical History  Surgical History Family History Social History   Past Medical History:   Diagnosis Date    Benign essential hypertension 05/23/2024    Persistent atrial fibrillation (H) 03/21/2024    Type 2 diabetes mellitus (H) 05/23/2024     Past Surgical History:   Procedure Laterality Date    CATARACT EXTRACTION      WRIST FRACTURE SURGERY      Right 1990s, Left 2004     Family History   Problem Relation Age of Onset    Hypertension Mother     Diabetes Mother     Hypertension Father     Diabetes Father     Breast Cancer No family hx of         Social History     Socioeconomic History    Marital status: Single     Spouse name: Not " on file    Number of children: Not on file    Years of education: Not on file    Highest education level: Not on file   Occupational History    Not on file   Tobacco Use    Smoking status: Never    Smokeless tobacco: Not on file   Substance and Sexual Activity    Alcohol use: Not on file    Drug use: Not on file    Sexual activity: Not on file   Other Topics Concern    Not on file   Social History Narrative    Not on file     Social Determinants of Health     Financial Resource Strain: Not on file   Food Insecurity: Not on file   Transportation Needs: Not on file   Physical Activity: Not on file   Stress: Not on file   Social Connections: Not on file   Interpersonal Safety: Not on file   Housing Stability: Not on file           Medications  Allergies   Current Outpatient Medications   Medication Sig Dispense Refill    amLODIPine-benazepril (LOTREL) 5-40 MG capsule Take 1 capsule by mouth daily      atorvastatin (LIPITOR) 10 MG tablet Take 1 tablet by mouth daily      Calcium Carbonate-Vitamin D 600-5 MG-MCG TABS 2 tablet with a meal Orally Once a day      hydrochlorothiazide (HYDRODIURIL) 25 MG tablet TAKE 1 TABLET BY MOUTH IN THE MORNING ONCE A DAY AS NEEDED FOR FLUID RETENTION      metFORMIN (GLUCOPHAGE XR) 500 MG 24 hr tablet Take 500 mg by mouth 2 times daily (with meals)      Multiple Vitamin (MULTIVITAMIN ADULT PO) Take 1 tablet by mouth daily      Multiple Vitamins-Minerals (PRESERVISION AREDS) TABS Take 1 tablet by mouth 2 times daily      ONETOUCH VERIO IQ test strip by In Vitro route daily      sotalol (BETAPACE) 80 MG tablet Take 1.5 tablets (120 mg) by mouth 2 times daily 270 tablet 3    vitamin E (TOCOPHEROL) 400 units (180 mg) capsule Take 1 tablet by mouth daily      XARELTO ANTICOAGULANT 20 MG TABS tablet Take 20 mg by mouth daily         Allergies   Allergen Reactions    Aspirin Unknown     Doesn't take due to bleeding risk    Penicillins Unknown    Sulfa (Sulfonamide Antibiotics) [Sulfa  "Antibiotics] Unknown          Lab Results    Chemistry/lipid CBC Cardiac Enzymes/BNP/TSH/INR   Recent Labs   Lab Test 06/18/24  1034 06/06/23  0909   CHOL 132 190   HDL 48* 61   LDL 53 104*   TRIG 156* 124     Recent Labs   Lab Test 06/18/24  1034 03/14/24  1122    139   POTASSIUM 3.8 4.0   CHLORIDE 101 98   CO2 24 25   ANIONGAP 13 16*   * 148*   BUN 29.2* 31.4*   CR 0.93 1.08*   JOY 10.2 11.6*          CBC RESULTS: No results for input(s): \"WBC\", \"RBC\", \"HGB\", \"HCT\", \"MCV\", \"MCH\", \"MCHC\", \"RDW\", \"PLT\" in the last 87140 hours.   TSH   Date Value Ref Range Status   03/14/2024 3.89 0.30 - 4.20 uIU/mL Final              The longitudinal plan of care for the diagnosis(es)/condition(s) as documented were addressed during this visit. Due to the added complexity in care, I will continue to support Cathy in the subsequent management and with ongoing continuity of care.                      Thank you for allowing me to participate in the care of your patient.      Sincerely,     JAZZMINE Vega CNP     [facility]  cc:   JAZZMINE Vega CNP  [address], [address]  [place] [address]      "

## 2024-07-25 NOTE — PATIENT INSTRUCTIONS
Cathy Randall,    It was a pleasure to see you today at the [facility].     My recommendations after this visit include:    Continue current medications    Keep using your Kardia device and log episodes of A fib.  Call if A fib becomes frequent or prolonged    Consider ablation to treat A fib    Follow up in 6 months, or sooner if needed    Elizabeth Villatoro, [facility], Electrophysiology  [phone]  EP nurses [phone]

## 2024-10-21 ENCOUNTER — ANCILLARY PROCEDURE (OUTPATIENT)
Dept: MAMMOGRAPHY | Facility: CLINIC | Age: 69
End: 2024-10-21
Attending: FAMILY MEDICINE
Payer: COMMERCIAL

## 2024-10-21 DIAGNOSIS — Z12.31 VISIT FOR SCREENING MAMMOGRAM: ICD-10-CM

## 2024-10-21 PROCEDURE — 77063 BREAST TOMOSYNTHESIS BI: CPT

## 2024-10-31 ENCOUNTER — TELEPHONE (OUTPATIENT)
Dept: CARDIOLOGY | Facility: CLINIC | Age: 69
End: 2024-10-31
Payer: COMMERCIAL

## 2024-10-31 NOTE — TELEPHONE ENCOUNTER
PC back to pt  Discussed with pt there are no limitations to exercise or activity with AF  Discussed with pt triggers for AF, which can be strenuous exercise, and if this is the case pt should avoid triggers  Pt verbalized understanding, has no further questions or concerns at this time, and has our contact information if needed.  10/31/2024 3:28 PM  Katie Alcantara RN    ----- Message from Caitie ALEGRE sent at 10/31/2024  2:40 PM CDT -----  Regarding: JAKOB  Caller: STONE  Primary cardiologist: JAKOB  Detailed reason for call: WHEN PT LAST MET WITH JAKOB SHE WAS TOLD NOT TO DO STRENUOUS EXERCISE AND SHE WOULD LIKE TO KNOW WHAT ARE THE EXERCISES THAT WOULD PUT HER AT RISK FOR AFIB     Best phone number: [phone]  Best time to contact: ANY  Ok to leave a detailedmessage? YES

## 2024-12-07 ENCOUNTER — HEALTH MAINTENANCE LETTER (OUTPATIENT)
Age: 69
End: 2024-12-07

## 2025-02-03 ENCOUNTER — OFFICE VISIT (OUTPATIENT)
Dept: CARDIOLOGY | Facility: CLINIC | Age: 70
End: 2025-02-03
Payer: COMMERCIAL

## 2025-02-03 VITALS
BODY MASS INDEX: 28.66 KG/M2 | WEIGHT: 172 LBS | HEIGHT: 65 IN | DIASTOLIC BLOOD PRESSURE: 68 MMHG | SYSTOLIC BLOOD PRESSURE: 128 MMHG | HEART RATE: 57 BPM | RESPIRATION RATE: 16 BRPM

## 2025-02-03 DIAGNOSIS — I48.19 PERSISTENT ATRIAL FIBRILLATION (H): Primary | ICD-10-CM

## 2025-02-03 DIAGNOSIS — I10 BENIGN ESSENTIAL HYPERTENSION: ICD-10-CM

## 2025-02-03 LAB
ATRIAL RATE - MUSE: 57 BPM
DIASTOLIC BLOOD PRESSURE - MUSE: NORMAL MMHG
INTERPRETATION ECG - MUSE: NORMAL
P AXIS - MUSE: 60 DEGREES
PR INTERVAL - MUSE: 132 MS
QRS DURATION - MUSE: 86 MS
QT - MUSE: 450 MS
QTC - MUSE: 438 MS
R AXIS - MUSE: 40 DEGREES
SYSTOLIC BLOOD PRESSURE - MUSE: NORMAL MMHG
T AXIS - MUSE: 44 DEGREES
VENTRICULAR RATE- MUSE: 57 BPM

## 2025-02-03 PROCEDURE — 99214 OFFICE O/P EST MOD 30 MIN: CPT | Performed by: NURSE PRACTITIONER

## 2025-02-03 PROCEDURE — 93000 ELECTROCARDIOGRAM COMPLETE: CPT | Performed by: INTERNAL MEDICINE

## 2025-02-03 PROCEDURE — G2211 COMPLEX E/M VISIT ADD ON: HCPCS | Performed by: NURSE PRACTITIONER

## 2025-02-03 RX ORDER — SOTALOL HYDROCHLORIDE 80 MG/1
120 TABLET ORAL 2 TIMES DAILY
Qty: 270 TABLET | Refills: 3 | Status: SHIPPED | OUTPATIENT
Start: 2025-02-03

## 2025-02-03 NOTE — LETTER
2/3/2025    Ankur Queen MD  [address] [place] [address]    RE: Cathy Randall       Dear Colleague,     I had the pleasure of seeing Cathy Randall in the [facility].    HEART CARE ELECTROPHYSIOLOGY NOTE      [facility]  [phone]      Assessment/Recommendations   Assessment/Plan:  1.  Persistent Atrial Fibrillation: AF is again increasing, primarily in episode duration.  We reviewed treatment options including rate control versus rhythm control with medications or catheter ablation.  Recommend catheter ablation.    -- Rhythm monitoring with [employer] a[employer]a  -- Continue sotalol 120 mg twice daily  -- Plan for catheter ablation with Dr. Seals    She was reassured that atrial fibrillation is not life-threatening, but carries an increased risk for stroke.  She has a [id number] score of 4 for age 65-74, female gender, HTN, DM 2.    --Continue Xarelto 20 mg daily for stroke prophylaxis with full meal to ensure adequate absorption    2.  Hypertension: Controlled with hydrochlorothiazide and sotalol.         History of Present Illness/Subjective    HPI: Cathy Randall is a 69 year old female who comes in today for EP follow-up of atrial fibrillation.  He has a history of persistent atrial fibrillation, hypertension, type 2 diabetes    Arrhythmia history  Dx/date: Atrial fibrillation diagnosed 3/14/2024, symptom onset approximately 1 month prior.  Converted to SR with initiation of sotalol.  Sx: Fatigue, diaphoresis, weakness, mild dyspnea on exertion  [id number] score: 4 for age 65-74, female gender, HTN, DM 2  Oral anticoagulation: Xarelto 20 mg daily  Antiarrhythmic medications, AV pretty blocking agents: Sotalol 80 mg twice daily (3/21/2024, increased to 120 mg twice daily 5/23/2024)  Procedures  DCCV: 4/4/2024--canceled, arrived in SR  Ablation: N/A    Cathy states that she has been feeling well.  She has been using her Kardia device to plug AF  episodes; they are occurring 2-4 times a month, but lasting longer than previous, now a couple of hours.  She denies chest discomfort, palpitations, abdominal fullness/bloating or peripheral edema, shortness of breath, paroxysmal nocturnal dyspnea, orthopnea, lightheadedness, dizziness, pre-syncope, or syncope.    Cardiographics (EKG personally reviewed):  EKG done 2/3/2025 shows sinus rhythm at 57 bpm, QRS 86 ms, QT/QTc 450/438 ms  EKG done 5/28/2024 shows sinus rhythm at 57 bpm, QRS 92 ms, QT/QTc 470/457 ms  EKG done 4/4/2024 shows sinus rhythm at 61 bpm, PACs, QRS 84 ms, QT/QTc 480/483 ms.    Kardia strips: Episodes of AF-RVR (VR 110s-120) occurring 3-4 times per month.    MCOT worn 3/29/2024 to 4/27/2024:  Predominant underlying rhythm was sinus rhythm, 46 to 159bpm, average 80 bpm.  Total atrial fibrillation and possible atrial flutter burden was 42%  There were no pauses noted.  Rare supraventricular ectopic beats (1%).  Rare premature ventricular contractions (2%).  Symptom triggers (1 event, other symptoms) correlated to atrial fibrillation.    ECHO done 4/4/2024:  The left ventricle is normal in size. There is normal left ventricular wall  thickness.  Left ventricular systolic function is normal. The visual ejection fraction is  55-60%. No regional wall motion abnormalities noted.     The right ventricle is normal in size and function.  Normal left atrial size. Right atrial size is normal.  There is mild (1+) mitral regurgitation.  Mild (35-45mmHg) pulmonary hypertension is present.  IVC diameter <2.1 cm collapsing >50% with sniff suggests a normal RA pressure  of 3 mmHg.  There is no comparison study available.    I have reviewed and updated the patient's Past Medical History, Social History, Family History and Medication List.  Outside records personally reviewed.     Physical Examination  Review of Systems   Vitals: /68 (BP Location: Right arm, Patient Position: Sitting, Cuff Size: Adult Regular)   " Pulse 57   Resp 16   Ht 1.638 m (5' 4.5\")   Wt 78 kg (172 lb)   BMI 29.07 kg/m    BMI= Body mass index is 29.07 kg/m .  Wt Readings from Last 3 Encounters:   02/03/25 78 kg (172 lb)   07/25/24 75.8 kg (167 lb)   05/28/24 76.1 kg (167 lb 12.8 oz)       General Appearance:   Alert, well-appearing and in no acute distress.   HEENT: Atraumatic, normocephalic.  No scleral icterus, normal conjunctivae, EOMs intact, PERRL.  Mucous membranes pink and moist.     Chest/Lungs:   Chest symmetric, spine straight.  Respirations unlabored.  Lungs are clear to auscultation.   Cardiovascular:   Regular rate and rhythm.  Normal first and second heart sounds with no murmurs, rubs, or gallops; radial pulses are intact, No edema.   Abdomen:  Soft, nondistended.   Extremities: No cyanosis or clubbing.   Musculoskeletal: Moves all extremities.     Skin: Warm, dry, intact.    Neurologic: Mood and affect are appropriate.  Alert and oriented to person, place, time, and situation.     ROS: 10 point ROS neg other than the symptoms noted above in the HPI.         Medical History  Surgical History Family History Social History   Past Medical History:   Diagnosis Date     Benign essential hypertension 05/23/2024     Persistent atrial fibrillation (H) 03/21/2024     Type 2 diabetes mellitus (H) 05/23/2024     Past Surgical History:   Procedure Laterality Date     CATARACT EXTRACTION       WRIST FRACTURE SURGERY      Right 1990s, Left 2004     Family History   Problem Relation Age of Onset     Hypertension Mother      Diabetes Mother      Hypertension Father      Diabetes Father      Breast Cancer No family hx of         Social History     Socioeconomic History     Marital status: Single     Spouse name: Not on file     Number of children: Not on file     Years of education: Not on file     Highest education level: Not on file   Occupational History     Not on file   Tobacco Use     Smoking status: Never     Smokeless tobacco: Not on file "   Substance and Sexual Activity     Alcohol use: Not on file     Drug use: Not on file     Sexual activity: Not on file   Other Topics Concern     Not on file   Social History Narrative     Not on file     Social Drivers of Health     Financial Resource Strain: Not on file   Food Insecurity: Not on file   Transportation Needs: Not on file   Physical Activity: Not on file   Stress: Not on file   Social Connections: Not on file   Interpersonal Safety: Not on file   Housing Stability: Not on file           Medications  Allergies   Current Outpatient Medications   Medication Sig Dispense Refill     amLODIPine-benazepril (LOTREL) 5-40 MG capsule Take 1 capsule by mouth daily       atorvastatin (LIPITOR) 10 MG tablet Take 1 tablet by mouth daily       Calcium Carbonate-Vitamin D 600-5 MG-MCG TABS 2 tablet with a meal Orally Once a day       hydrochlorothiazide (HYDRODIURIL) 25 MG tablet TAKE 1 TABLET BY MOUTH IN THE MORNING ONCE A DAY AS NEEDED FOR FLUID RETENTION       metFORMIN (GLUCOPHAGE XR) 500 MG 24 hr tablet Take 500 mg by mouth 2 times daily (with meals). 2000 mg a day sometimes break it up       Multiple Vitamin (MULTIVITAMIN ADULT PO) Take 1 tablet by mouth daily       Multiple Vitamins-Minerals (PRESERVISION AREDS) TABS Take 1 tablet by mouth 2 times daily       ONETOUCH VERIO IQ test strip by In Vitro route daily       sotalol (BETAPACE) 80 MG tablet Take 1.5 tablets (120 mg) by mouth 2 times daily. 270 tablet 3     vitamin E (TOCOPHEROL) 400 units (180 mg) capsule Take 1 tablet by mouth daily       XARELTO ANTICOAGULANT 20 MG TABS tablet Take 20 mg by mouth daily         Allergies   Allergen Reactions     Aspirin Unknown     Doesn't take due to bleeding risk     Penicillins Unknown     Sulfa (Sulfonamide Antibiotics) [Sulfa Antibiotics] Unknown        Pt denies previous adverse reaction to anesthesia      Lab Results    Chemistry/lipid CBC Cardiac Enzymes/BNP/TSH/INR   Recent Labs   Lab Test 06/18/24  1034  "06/06/23  0909   CHOL 132 190   HDL 48* 61   LDL 53 104*   TRIG 156* 124     Recent Labs   Lab Test 06/18/24  1034 03/14/24  1122    139   POTASSIUM 3.8 4.0   CHLORIDE 101 98   CO2 24 25   ANIONGAP 13 16*   * 148*   BUN 29.2* 31.4*   CR 0.93 1.08*   JOY 10.2 11.6*      CBC RESULTS: No results for input(s): \"WBC\", \"RBC\", \"HGB\", \"HCT\", \"MCV\", \"MCH\", \"MCHC\", \"RDW\", \"PLT\" in the last 42636 hours.     TSH   Date Value Ref Range Status   03/14/2024 3.89 0.30 - 4.20 uIU/mL Final            The longitudinal plan of care for the diagnosis(es)/condition(s) as documented were addressed during this visit. Due to the added complexity in care, I will continue to support Cathy in the subsequent management and with ongoing continuity of care.                                          Thank you for allowing me to participate in the care of your patient.      Sincerely,     JAZZMINE Vega CNP     [facility]  cc:   JAZZMINE Vega CNP  [address], [address]  [place] [address]      "

## 2025-02-03 NOTE — PATIENT INSTRUCTIONS
Cathy Randall,    It was a pleasure to see you today at the [facility].     My recommendations after this visit include:    Plan for ablation with Dr. Seals  No medication changes at this time    Elizabeth Villatoro CNP  [facility], Electrophysiology  [phone]  EP nurses [phone]     Information on Atrial Fibrillation Ablations    What is Catheter Ablation?  A Catheter Ablation is a procedure that treats certain types of abnormal heart rhythms (arrhythmia). There are several components to the procedure, but the final purpose is target and destroy (ablate) small areas of your heart muscle that are causing the arrhythmia.     Why is an Ablations Done?  A catheter ablation is an effective way to treat some types of abnormal heart rhythms. An ablation is a relatively low risk procedure that may permanently cure your abnormal heart rhythm.  The ablation process damages the heart cells which results in scarring of that area. The scar is electrically inactive and can produce a permanent cure for the abnormal rhythm.  Ablation procedures can help avoid the need for rhythm medications and give patients the ability to return to their normal activity and live an active life. In patients that do not have symptoms, ablations are not typically done as there may still be an increased risk of stroke.    Why is Catheter Ablation Done?  Sometimes, the heart s electrical system does not work properly.  This can cause abnormal heart rhythms, called arrhythmias.  During an arrhythmia, the heart may beat too fast, too slowly, or irregularly.  Your doctor has recommended catheter ablation to treat a rapid (fast) heart rhythm, or tachycardia.      How Catheter Ablation Is Done  Catheter ablation uses thin, flexible wires called electrode catheters to find and destroy (ablate) problem cells.     Here is how the procedure is done:  The pulmonary veins will be treated first. There are  currently two tools used to ablate around the pulmonary veins.  Radiofrequency catheter will heat the tissue.  Cryo-balloon catheter will freeze the tissue.   Testing will be done to confirm that effective treatment has been delivered.   Further testing may be performed to see if a fib is still present or if some other rhythm problem such as atrial flutter is present. If an ongoing rhythm problem is discovered then further ablation can be done to isolate and destroy those areas responsible for the arrhythmia.     Your  Experience during Catheter Ablation    In most cases, catheter ablations are done in an electrophysiology (EP) lab.  The procedural area: You will be transferred back to the procedure room once you have been appropriately prepped by the nursing staff and you are ready for your ablation.   Sedation: You to be put completely asleep for your ablation using general anesthesia.  Inserting the catheters: You will have 3-4 catheters inserted into the veins. Catheter locations can include the shoulder, neck, and groins. Catheters are guided to the heart with the help of ultrasound and x-ray monitors.  Finishing up: When the procedure is finished, the catheters are taken out of your body. A special closure device or suture may be used to help seal these puncture sites. You re then taken to your room to rest, and will be cared for by a nurse during your recovery.    Risks and Complications  The risks of catheter ablation are fairly low compared to the benefits you receive. Possible risks and complications include:  Common (up to 10%)  Bleeding or bruising  Shortness of breath  Heartburn  Uncommon (< 1%)  Blood clots  A slow heart rhythm (requiring a permanent pacemaker)  Perforation of the heart muscle, blood vessel, or lung (may require an emergency procedure)  Stroke  Damage to a heart valve   Heart attack, also known as acute myocardial infarction, or AMI   Death (extremely rare)    Before your Catheter  Ablation  Before your catheter ablation, you will meet with the Electrophysiologist (specially trained heart doctor) who will do the procedure. The provider or a registered nurse will provide you with detailed instructions on how to prepare for this procedure, some of these instructions are listed below.  You will likely be told to stop or change your heart rhythm medications for a period of time before your ablation.   You may have testing done several days prior to your ablation or the morning of your ablation, such as an ECG, x-ray, blood tests, or echocardiogram.   You will not be allowed to eat or drink 8 hours before your ablation. You will be given further instructions by your physician or a registered nurse regarding the medication you will take the morning of your procedure.  You will need to arrange to have a  home from the hospital; you will not be permitted to drive after your procedure due to the sedation that you receive.   You are allowed to bring personal items and clothing to the hospital, but please refrain from bringing any valuables as the hospital is not responsible for any lost or stolen items.  You will need to bring a list of the names and dosages of the medication you are taking to the hospital.  It is important to mention to your doctor or registered nurse if you have any allergies, reactions to anesthesia, or have had history of bleeding problems.    Arriving at the Hospital the morning of your Catheter Ablation  Please check in at the time that was given to you by the , who scheduled your procedure. You do not need to arrive any earlier than the time that you were given.    When you arrive, you will be directed to the area where they will be performing your procedure. The doctor or registered nurse will meet with you prior to your ablation, this is a good time to ask questions and address any concerns you may have. You will then be asked to sign the consent form for your  ablation, if this has not already been done.    The nursing staff will begin to prepare you for your procedure:  A nurse will shave and cleanse the area where the ablation catheters will be placed. These areas are most commonly the left and right groin sites (the fold between your thigh and abdomen), and in some cases the chest, arm, and neck. This is done to reduce the risk of infection.    The nursing staff will start an intravenous (IV) line into a vein in your arm, which allows the staff to give you medication and sedatives to help you relax prior and during your ablation.  In some cases, the nursing staff will need to place a catheter that will drain urine from your bladder (Alexandre Catheter), which is required due to the length and complexity of the ablation you are having.    After Catheter Ablation  Recovery immediately after your ablation in the hospital  After your catheter ablation procedure, you will be taken to a recovery room. After your ablation, you may be required to lay flat or be on bed rest. During this time, a nurse will monitor you, and you will be given medication to make you comfortable.     Going Home  When it is time to go home, your will need to have an adult family member or friend drive you. Most people can walk, climb stairs, and perform light activity soon after catheter ablation. You can most likely return to your full routine within a few days. However, you may be told to avoid running, heavy lifting, and other strenuous activities for a short time. Please make sure to follow any specific activity restrictions provided by the medical staff at the time of your discharge from the hospital.  Doctor's typically advise that you not drive until your post procedure assessment visit.   Avoid heavy physical activity and heavy lifting for several days after the procedure to allow your body to heal.  Ask your doctor when you can expect to return to work.  Take your temperature and check your  incision for signs of infection (redness, swelling, drainage, or warmth) every day for a week. It is normal to have a small bruise or lump where the catheter was inserted.  Take your medications exactly as directed. Do not skip doses or stop medication without consulting your physician prior.  Learn to take your own pulse and keep a record of your results.    Follow-Up  After your ablations you will have a follow up visit to see how you are doing, to assess your rhythm after your ablation, and to address any medication changes if necessary. In many cases, one ablation is enough to treat an arrhythmia. However, sometimes the problem returns or another is found. If this happens, you may need a second catheter ablation. Tell your healthcare provider if you have any new or returning symptoms.    Common Symptoms after Catheter Ablation  In the first few weeks after catheter ablation, you may feel mild chest fullness or aching. You may also feel as if your heart is skipping beats or your heartbeat may feel faster than normal. You may think that your heart rhythm problem is about to return. These sensations are normal and usually go away with time. Talk to your healthcare provider if you are concerned.      When to Call Your Doctor  Increased bleeding, bruising, or pain at the insertion site  Episodes of atrial fibrillation are common post procedure, call the clinic if episodes are lasting longer than 4-6 hours  Difficulty with your speech or walking, or any visual disturbance  Lightheaded, dizziness, or feeling faint  Shortness of breath or chest pain  Coldness, swelling, or numbness of the arm or leg near the insertion site  A bruise or lump at the insertion site that is larger than a walnut  A fever over 100 F

## 2025-02-03 NOTE — PROGRESS NOTES
HEART CARE ELECTROPHYSIOLOGY NOTE      [facility]  [phone]      Assessment/Recommendations   Assessment/Plan:  1.  Persistent Atrial Fibrillation: AF is again increasing, primarily in episode duration.  We reviewed treatment options including rate control versus rhythm control with medications or catheter ablation.  Recommend catheter ablation.    -- Rhythm monitoring with Kardia alyssa  -- Continue sotalol 120 mg twice daily  -- Plan for catheter ablation with Dr. Seals    She was reassured that atrial fibrillation is not life-threatening, but carries an increased risk for stroke.  She has a [id number] score of 4 for age 65-74, female gender, HTN, DM 2.    --Continue Xarelto 20 mg daily for stroke prophylaxis with full meal to ensure adequate absorption    2.  Hypertension: Controlled with hydrochlorothiazide and sotalol.         History of Present Illness/Subjective    HPI: Cathy Randall is a 69 year old female who comes in today for EP follow-up of atrial fibrillation.  He has a history of persistent atrial fibrillation, hypertension, type 2 diabetes    Arrhythmia history  Dx/date: Atrial fibrillation diagnosed 3/14/2024, symptom onset approximately 1 month prior.  Converted to SR with initiation of sotalol.  Sx: Fatigue, diaphoresis, weakness, mild dyspnea on exertion  [id number] score: 4 for age 65-74, female gender, HTN, DM 2  Oral anticoagulation: Xarelto 20 mg daily  Antiarrhythmic medications, AV pretty blocking agents: Sotalol 80 mg twice daily (3/21/2024, increased to 120 mg twice daily 5/23/2024)  Procedures  DCCV: 4/4/2024--canceled, arrived in SR  Ablation: N/A    Cathy states that she has been feeling well.  She has been using her Kardia device to plug AF episodes; they are occurring 2-4 times a month, but lasting longer than previous, now a couple of hours.  She denies chest discomfort, palpitations, abdominal fullness/bloating or peripheral edema, shortness of  "breath, paroxysmal nocturnal dyspnea, orthopnea, lightheadedness, dizziness, pre-syncope, or syncope.    Cardiographics (EKG personally reviewed):  EKG done 2/3/2025 shows sinus rhythm at 57 bpm, QRS 86 ms, QT/QTc 450/438 ms  EKG done 5/28/2024 shows sinus rhythm at 57 bpm, QRS 92 ms, QT/QTc 470/457 ms  EKG done 4/4/2024 shows sinus rhythm at 61 bpm, PACs, QRS 84 ms, QT/QTc 480/483 ms.    Kardia strips: Episodes of AF-RVR (VR 110s-120) occurring 3-4 times per month.    MCOT worn 3/29/2024 to 4/27/2024:  Predominant underlying rhythm was sinus rhythm, 46 to 159bpm, average 80 bpm.  Total atrial fibrillation and possible atrial flutter burden was 42%  There were no pauses noted.  Rare supraventricular ectopic beats (1%).  Rare premature ventricular contractions (2%).  Symptom triggers (1 event, other symptoms) correlated to atrial fibrillation.    ECHO done 4/4/2024:  The left ventricle is normal in size. There is normal left ventricular wall  thickness.  Left ventricular systolic function is normal. The visual ejection fraction is  55-60%. No regional wall motion abnormalities noted.     The right ventricle is normal in size and function.  Normal left atrial size. Right atrial size is normal.  There is mild (1+) mitral regurgitation.  Mild (35-45mmHg) pulmonary hypertension is present.  IVC diameter <2.1 cm collapsing >50% with sniff suggests a normal RA pressure  of 3 mmHg.  There is no comparison study available.    I have reviewed and updated the patient's Past Medical History, Social History, Family History and Medication List.  Outside records personally reviewed.     Physical Examination  Review of Systems   Vitals: /68 (BP Location: Right arm, Patient Position: Sitting, Cuff Size: Adult Regular)   Pulse 57   Resp 16   Ht 1.638 m (5' 4.5\")   Wt 78 kg (172 lb)   BMI 29.07 kg/m    BMI= Body mass index is 29.07 kg/m .  Wt Readings from Last 3 Encounters:   02/03/25 78 kg (172 lb)   07/25/24 75.8 kg (167 " lb)   05/28/24 76.1 kg (167 lb 12.8 oz)       General Appearance:   Alert, well-appearing and in no acute distress.   HEENT: Atraumatic, normocephalic.  No scleral icterus, normal conjunctivae, EOMs intact, PERRL.  Mucous membranes pink and moist.     Chest/Lungs:   Chest symmetric, spine straight.  Respirations unlabored.  Lungs are clear to auscultation.   Cardiovascular:   Regular rate and rhythm.  Normal first and second heart sounds with no murmurs, rubs, or gallops; radial pulses are intact, No edema.   Abdomen:  Soft, nondistended.   Extremities: No cyanosis or clubbing.   Musculoskeletal: Moves all extremities.     Skin: Warm, dry, intact.    Neurologic: Mood and affect are appropriate.  Alert and oriented to person, place, time, and situation.     ROS: 10 point ROS neg other than the symptoms noted above in the HPI.         Medical History  Surgical History Family History Social History   Past Medical History:   Diagnosis Date    Benign essential hypertension 05/23/2024    Persistent atrial fibrillation (H) 03/21/2024    Type 2 diabetes mellitus (H) 05/23/2024     Past Surgical History:   Procedure Laterality Date    CATARACT EXTRACTION      WRIST FRACTURE SURGERY      Right 1990s, Left 2004     Family History   Problem Relation Age of Onset    Hypertension Mother     Diabetes Mother     Hypertension Father     Diabetes Father     Breast Cancer No family hx of         Social History     Socioeconomic History    Marital status: Single     Spouse name: Not on file    Number of children: Not on file    Years of education: Not on file    Highest education level: Not on file   Occupational History    Not on file   Tobacco Use    Smoking status: Never    Smokeless tobacco: Not on file   Substance and Sexual Activity    Alcohol use: Not on file    Drug use: Not on file    Sexual activity: Not on file   Other Topics Concern    Not on file   Social History Narrative    Not on file     Social Drivers of Health      Financial Resource Strain: Not on file   Food Insecurity: Not on file   Transportation Needs: Not on file   Physical Activity: Not on file   Stress: Not on file   Social Connections: Not on file   Interpersonal Safety: Not on file   Housing Stability: Not on file           Medications  Allergies   Current Outpatient Medications   Medication Sig Dispense Refill    amLODIPine-benazepril (LOTREL) 5-40 MG capsule Take 1 capsule by mouth daily      atorvastatin (LIPITOR) 10 MG tablet Take 1 tablet by mouth daily      Calcium Carbonate-Vitamin D 600-5 MG-MCG TABS 2 tablet with a meal Orally Once a day      hydrochlorothiazide (HYDRODIURIL) 25 MG tablet TAKE 1 TABLET BY MOUTH IN THE MORNING ONCE A DAY AS NEEDED FOR FLUID RETENTION      metFORMIN (GLUCOPHAGE XR) 500 MG 24 hr tablet Take 500 mg by mouth 2 times daily (with meals). 2000 mg a day sometimes break it up      Multiple Vitamin (MULTIVITAMIN ADULT PO) Take 1 tablet by mouth daily      Multiple Vitamins-Minerals (PRESERVISION AREDS) TABS Take 1 tablet by mouth 2 times daily      ONETOUCH VERIO IQ test strip by In Vitro route daily      sotalol (BETAPACE) 80 MG tablet Take 1.5 tablets (120 mg) by mouth 2 times daily. 270 tablet 3    vitamin E (TOCOPHEROL) 400 units (180 mg) capsule Take 1 tablet by mouth daily      XARELTO ANTICOAGULANT 20 MG TABS tablet Take 20 mg by mouth daily         Allergies   Allergen Reactions    Aspirin Unknown     Doesn't take due to bleeding risk    Penicillins Unknown    Sulfa (Sulfonamide Antibiotics) [Sulfa Antibiotics] Unknown        Pt denies previous adverse reaction to anesthesia      Lab Results    Chemistry/lipid CBC Cardiac Enzymes/BNP/TSH/INR   Recent Labs   Lab Test 06/18/24  1034 06/06/23  0909   CHOL 132 190   HDL 48* 61   LDL 53 104*   TRIG 156* 124     Recent Labs   Lab Test 06/18/24  1034 03/14/24  1122    139   POTASSIUM 3.8 4.0   CHLORIDE 101 98   CO2 24 25   ANIONGAP 13 16*   * 148*   BUN 29.2* 31.4*  "  CR 0.93 1.08*   JOY 10.2 11.6*      CBC RESULTS: No results for input(s): \"WBC\", \"RBC\", \"HGB\", \"HCT\", \"MCV\", \"MCH\", \"MCHC\", \"RDW\", \"PLT\" in the last [id number] hours.     TSH   Date Value Ref Range Status   03/14/2024 3.89 0.30 - 4.20 uIU/mL Final            The longitudinal plan of care for the diagnosis(es)/condition(s) as documented were addressed during this visit. Due to the added complexity in care, I will continue to support Cathy in the subsequent management and with ongoing continuity of care.                                        "

## 2025-02-13 ENCOUNTER — TELEPHONE (OUTPATIENT)
Dept: CARDIOLOGY | Facility: CLINIC | Age: 70
End: 2025-02-13
Payer: COMMERCIAL

## 2025-02-13 NOTE — TELEPHONE ENCOUNTER
[facility] Call Center    Phone Message    May a detailed message be left on voicemail: yes     Reason for Call: Other: Per notes from OV on 2/3, patient asked to get this scheduled and she would like to schedule it with Dr Seals     Action Taken: Other: Cardiology    Travel Screening: Not Applicable    Thank you!  [facility]       Date of Service:

## 2025-03-05 ENCOUNTER — TELEPHONE (OUTPATIENT)
Dept: CARDIOLOGY | Facility: CLINIC | Age: 70
End: 2025-03-05
Payer: COMMERCIAL

## 2025-03-05 NOTE — TELEPHONE ENCOUNTER
[facility] Call Center    Phone Message    May a detailed message be left on voicemail: yes     Reason for Call: Other: Patient will like to know if care team has sent prior auth for upcoming procedure to insurance and the status. Please call patient back to further discuss.      Action Taken: Other: Cardiology    Travel Screening: Not Applicable     Thank you!  [facility]

## 2025-03-05 NOTE — TELEPHONE ENCOUNTER
Patient called back and [facility] doesn't have the prior authorization, please call the provider line at [facility].    [facility] Provider Line:  [phone]  Please call the patient back with an update after speaking with [facility], this prior authorization is required.

## 2025-03-05 NOTE — TELEPHONE ENCOUNTER
Returned phone call to pt. CPT codes provided to pt on letter and pt can contact insurance company for follow up.     Jennifer Lucero RN

## 2025-03-06 ENCOUNTER — ALLIED HEALTH/NURSE VISIT (OUTPATIENT)
Dept: CARDIOLOGY | Facility: CLINIC | Age: 70
End: 2025-03-06
Payer: COMMERCIAL

## 2025-03-06 ENCOUNTER — LAB (OUTPATIENT)
Dept: CARDIOLOGY | Facility: CLINIC | Age: 70
End: 2025-03-06
Payer: COMMERCIAL

## 2025-03-06 ENCOUNTER — OFFICE VISIT (OUTPATIENT)
Dept: CARDIOLOGY | Facility: CLINIC | Age: 70
End: 2025-03-06
Payer: COMMERCIAL

## 2025-03-06 VITALS
BODY MASS INDEX: 29.4 KG/M2 | RESPIRATION RATE: 16 BRPM | HEIGHT: 64 IN | SYSTOLIC BLOOD PRESSURE: 120 MMHG | DIASTOLIC BLOOD PRESSURE: 70 MMHG | HEART RATE: 61 BPM | WEIGHT: 172.2 LBS

## 2025-03-06 DIAGNOSIS — I48.19 PERSISTENT ATRIAL FIBRILLATION (H): ICD-10-CM

## 2025-03-06 DIAGNOSIS — I48.19 PERSISTENT ATRIAL FIBRILLATION (H): Primary | ICD-10-CM

## 2025-03-06 DIAGNOSIS — I10 BENIGN ESSENTIAL HYPERTENSION: ICD-10-CM

## 2025-03-06 LAB
ANION GAP SERPL CALCULATED.3IONS-SCNC: 11 MMOL/L (ref 7–15)
ATRIAL RATE - MUSE: 61 BPM
BUN SERPL-MCNC: 18.6 MG/DL (ref 8–23)
CALCIUM SERPL-MCNC: 10.4 MG/DL (ref 8.8–10.4)
CHLORIDE SERPL-SCNC: 100 MMOL/L (ref 98–107)
CREAT SERPL-MCNC: 0.79 MG/DL (ref 0.51–0.95)
DIASTOLIC BLOOD PRESSURE - MUSE: NORMAL MMHG
EGFRCR SERPLBLD CKD-EPI 2021: 80 ML/MIN/1.73M2
ERYTHROCYTE [DISTWIDTH] IN BLOOD BY AUTOMATED COUNT: 12.3 % (ref 10–15)
GLUCOSE SERPL-MCNC: 154 MG/DL (ref 70–99)
HCO3 SERPL-SCNC: 27 MMOL/L (ref 22–29)
HCT VFR BLD AUTO: 46.6 % (ref 35–47)
HGB BLD-MCNC: 15.8 G/DL (ref 11.7–15.7)
INTERPRETATION ECG - MUSE: NORMAL
MCH RBC QN AUTO: 32 PG (ref 26.5–33)
MCHC RBC AUTO-ENTMCNC: 33.9 G/DL (ref 31.5–36.5)
MCV RBC AUTO: 94 FL (ref 78–100)
P AXIS - MUSE: 58 DEGREES
PLATELET # BLD AUTO: 233 10E3/UL (ref 150–450)
POTASSIUM SERPL-SCNC: 4.5 MMOL/L (ref 3.4–5.3)
PR INTERVAL - MUSE: 140 MS
QRS DURATION - MUSE: 76 MS
QT - MUSE: 458 MS
QTC - MUSE: 461 MS
R AXIS - MUSE: 19 DEGREES
RBC # BLD AUTO: 4.94 10E6/UL (ref 3.8–5.2)
SODIUM SERPL-SCNC: 138 MMOL/L (ref 135–145)
SYSTOLIC BLOOD PRESSURE - MUSE: NORMAL MMHG
T AXIS - MUSE: 35 DEGREES
VENTRICULAR RATE- MUSE: 61 BPM
WBC # BLD AUTO: 8.7 10E3/UL (ref 4–11)

## 2025-03-06 PROCEDURE — 36415 COLL VENOUS BLD VENIPUNCTURE: CPT

## 2025-03-06 PROCEDURE — 80048 BASIC METABOLIC PNL TOTAL CA: CPT

## 2025-03-06 PROCEDURE — 93000 ELECTROCARDIOGRAM COMPLETE: CPT | Performed by: INTERNAL MEDICINE

## 2025-03-06 PROCEDURE — 85027 COMPLETE CBC AUTOMATED: CPT

## 2025-03-06 NOTE — H&P (VIEW-ONLY)
HEART CARE ELECTROPHYSIOLOGY NOTE      [facility]  [phone]      Assessment/Recommendations   Assessment/Plan:  1.  Persistent atrial fibrillation/Stage 3B:  Initially diagnosed 3/14/2024.  Symptoms consist of fatigue, diaphoresis, weakness, mild dyspnea on exertion .  Failed antiarrhythmic therapy with sotalol.   She is scheduled for pulmonary vein isolation ablation with Dr. Seals on 3/17/2025.  We discussed ablation, <1-2% risk for complication, expected recovery, and follow-up.    She states that her questions were answered to her satisfaction and she is ready to proceed.  -- Hold sotalol 3 days prior to ablation.  It will likely be restarted for 6 to 12 weeks following ablation.    She has a UKH2OE3-MFUz score of 4 for age 65-74, female gender, HTN, T2DM.  HAS-BLED score of 1 for age.     -- Continue Xarelto 20 mg daily with full meal for stroke prophylaxis.  She reports no missed doses or bleeding issues.      2. Hypertension: Controlled    Telephone follow up with EP RN on 3/20/2025  Follow-up with me 4/28/2025, 6 weeks post PVI     History of Present Illness/Subjective    HPI: Cathy Randall is a 69 year old female who comes in for EP follow up of atrial fibrillation and history and physical prior to pulmonary vein isolation ablation.  She  has a history of persistent atrial fibrillation, hypertension, type 2 diabetes     Arrhythmia history  Dx/date: Atrial fibrillation diagnosed 3/14/2024, symptom onset approximately 1 month prior.  Converted to SR with initiation of sotalol.  Sx: Fatigue, diaphoresis, weakness, mild dyspnea on exertion  [id number] score: 4 for age 65-74, female gender, HTN, T2DM  Oral anticoagulation: Xarelto 20 mg daily  Antiarrhythmic medications, AV pretty blocking agents: Sotalol 80 mg twice daily (3/21/2024, increased to 120 mg twice daily 5/23/2024)  Procedures  DCCV: 4/4/2024--canceled, arrived in SR  Ablation: Scheduled 3/17/2025 with Dr. Seals      Cathy states that she has been feeling well.  She uses her Kardia device to record AF episodes; they coccur 2-4 times a month lasting a couple of hours.  She denies chest discomfort, palpitations, abdominal fullness/bloating or peripheral edema, shortness of breath, paroxysmal nocturnal dyspnea, orthopnea, lightheadedness, dizziness, pre-syncope, or syncope.     Cardiographics (EKG personally reviewed):  EKG done 3/6/2025 shows sinus rhythm at 61 bpm, QRS 76 ms, QT/QTc 458/461 ms  EKG done 2/3/2025 shows sinus rhythm at 57 bpm, QRS 86 ms, QT/QTc 450/438 ms  EKG done 5/28/2024 shows sinus rhythm at 57 bpm, QRS 92 ms, QT/QTc 470/457 ms  EKG done 4/4/2024 shows sinus rhythm at 61 bpm, PACs, QRS 84 ms, QT/QTc 480/483 ms.     Kardia strips: Episodes of AF-RVR (VR 110s-120) occurring 3-4 times per month.     MCOT worn 3/29/2024 to 4/27/2024:  Predominant underlying rhythm was sinus rhythm, 46 to 159bpm, average 80 bpm.  Total atrial fibrillation and possible atrial flutter burden was 42%  There were no pauses noted.  Rare supraventricular ectopic beats (1%).  Rare premature ventricular contractions (2%).  Symptom triggers (1 event, other symptoms) correlated to atrial fibrillation.     ECHO done 4/4/2024:  The left ventricle is normal in size. There is normal left ventricular wall  thickness.  Left ventricular systolic function is normal. The visual ejection fraction is  55-60%. No regional wall motion abnormalities noted.     The right ventricle is normal in size and function.  Normal left atrial size. Right atrial size is normal.  There is mild (1+) mitral regurgitation.  Mild (35-45mmHg) pulmonary hypertension is present.  IVC diameter <2.1 cm collapsing >50% with sniff suggests a normal RA pressure  of 3 mmHg.  There is no comparison study available.    I have reviewed and updated the patient's Past Medical History, Social History, Family History and Medication List.  Outside records personally reviewed.  "    Physical Examination  Review of Systems   Vitals: /70 (BP Location: Right arm, Patient Position: Sitting, Cuff Size: Adult Regular)   Pulse 61   Resp 16   Ht 1.626 m (5' 4\")   Wt 78.1 kg (172 lb 3.2 oz)   BMI 29.56 kg/m    BMI= Body mass index is 29.56 kg/m .  Wt Readings from Last 3 Encounters:   03/06/25 78.1 kg (172 lb 3.2 oz)   02/03/25 78 kg (172 lb)   07/25/24 75.8 kg (167 lb)       General Appearance:   Alert, well-appearing and in no acute distress.   HEENT: Atraumatic, normocephalic.  No scleral icterus, normal conjunctivae, EOMs intact, PERRL.  Mucous membranes pink and moist.     Chest/Lungs:   Chest symmetric, spine straight.  Respirations unlabored.  Lungs are clear to auscultation.   Cardiovascular:   Regular rate and rhythm.  Normal first and second heart sounds with no murmurs, rubs, or gallops; radial and posterior tibial pulses are intact, No edema.   Abdomen:  Soft, nondistended   Extremities: No cyanosis or clubbing.   Musculoskeletal: Moves all extremities.    Skin: Warm, dry, intact.    Neurologic: Mood and affect are appropriate.  Alert and oriented to person, place, time, and situation.     ROS: 10 point ROS neg other than the symptoms noted above in the HPI.         Medical History  Surgical History Family History Social History   Past Medical History:   Diagnosis Date    Benign essential hypertension 05/23/2024    Persistent atrial fibrillation (H) 03/21/2024    Type 2 diabetes mellitus (H) 05/23/2024     Past Surgical History:   Procedure Laterality Date    CATARACT EXTRACTION      WRIST FRACTURE SURGERY      Right 1990s, Left 2004     Family History   Problem Relation Age of Onset    Hypertension Mother     Diabetes Mother     Hypertension Father     Diabetes Father     Breast Cancer No family hx of         Social History     Socioeconomic History    Marital status: Single     Spouse name: Not on file    Number of children: Not on file    Years of education: Not on file    " Highest education level: Not on file   Occupational History    Not on file   Tobacco Use    Smoking status: Never    Smokeless tobacco: Not on file   Substance and Sexual Activity    Alcohol use: Not on file    Drug use: Not on file    Sexual activity: Not on file   Other Topics Concern    Not on file   Social History Narrative    Not on file     Social Drivers of Health     Financial Resource Strain: Not on file   Food Insecurity: Not on file   Transportation Needs: Not on file   Physical Activity: Not on file   Stress: Not on file   Social Connections: Not on file   Interpersonal Safety: Not on file   Housing Stability: Not on file           Medications  Allergies   Current Outpatient Medications   Medication Sig Dispense Refill    amLODIPine-benazepril (LOTREL) 5-40 MG capsule Take 1 capsule by mouth daily      atorvastatin (LIPITOR) 10 MG tablet Take 1 tablet by mouth daily      Calcium Carbonate-Vitamin D 600-5 MG-MCG TABS 2 tablet with a meal Orally Once a day      hydrochlorothiazide (HYDRODIURIL) 25 MG tablet TAKE 1 TABLET BY MOUTH IN THE MORNING ONCE A DAY AS NEEDED FOR FLUID RETENTION      metFORMIN (GLUCOPHAGE XR) 500 MG 24 hr tablet Take 500 mg by mouth 2 times daily (with meals). 2000 mg a day sometimes break it up      Multiple Vitamin (MULTIVITAMIN ADULT PO) Take 1 tablet by mouth daily      Multiple Vitamins-Minerals (PRESERVISION AREDS) TABS Take 1 tablet by mouth 2 times daily      ONETOUCH VERIO IQ test strip by In Vitro route daily      sotalol (BETAPACE) 80 MG tablet Take 1.5 tablets (120 mg) by mouth 2 times daily. 270 tablet 3    vitamin E (TOCOPHEROL) 400 units (180 mg) capsule Take 1 tablet by mouth daily      XARELTO ANTICOAGULANT 20 MG TABS tablet Take 20 mg by mouth daily         Allergies   Allergen Reactions    Aspirin Unknown     Doesn't take due to bleeding risk    Penicillins Unknown    Sulfa (Sulfonamide Antibiotics) [Sulfa Antibiotics] Unknown        Denies previous adverse  reaction to anesthesia      Lab Results    Chemistry/lipid CBC Cardiac Enzymes/BNP/TSH/INR   Recent Labs   Lab Test 06/18/24  1034   CHOL 132   HDL 48*   LDL 53   TRIG 156*     Recent Labs   Lab Test 06/18/24  1034 06/06/23  0909 06/08/22  0921   LDL 53 104* 106     Recent Labs   Lab Test 06/18/24  1034      POTASSIUM 3.8   CHLORIDE 101   CO2 24   *   BUN 29.2*   CR 0.93   GFRESTIMATED 66   JOY 10.2     Recent Labs   Lab Test 06/18/24  1034 03/14/24  1122 06/06/23  0909   CR 0.93 1.08* 0.97*     BMP and CBC drawn today, results pending   CBC RESULTS:   Recent Labs   Lab Test 03/06/25  0950   WBC 8.7   RBC 4.94   HGB 15.8*   HCT 46.6   MCV 94   MCH 32.0   MCHC 33.9   RDW 12.3           Recent Labs   Lab Test 03/14/24  1122   TSH 3.89     Recent Labs   Lab Test 03/14/24  1122   INR 1.10      The longitudinal plan of care for the diagnosis(es)/condition(s) as documented were addressed during this visit. Due to the added complexity in care, I will continue to support Cathy in the subsequent management and with ongoing continuity of care.

## 2025-03-06 NOTE — PATIENT INSTRUCTIONS
Cathy Randall,    It was a pleasure to see you today at the [facility].     My recommendations after this visit include:    You are scheduled for pulmonary vein isolation ablation with Dr. Seals on 3/17/2025    Stop taking sotalol after your evening dose on 3/13/2025.  It will likely be restarted for 6 to 12 weeks after ablation.    Telephone follow up with EP RN on 3/20/2025  Follow-up with me 4/28/2025    Elizabeth Villatoro CNP  [facility], Electrophysiology  [phone]  EP nurses [phone]

## 2025-03-06 NOTE — LETTER
3/6/2025    Ankur Queen MD  [address] [place] [address]    RE: Cathy Randall       Dear Colleague,     I had the pleasure of seeing Cathy Randall in the [facility].    HEART CARE ELECTROPHYSIOLOGY NOTE      [facility]  [phone]      Assessment/Recommendations   Assessment/Plan:  1.  Persistent atrial fibrillation/Stage 3B:  Initially diagnosed 3/14/2024.  Symptoms consist of fatigue, diaphoresis, weakness, mild dyspnea on exertion .  Failed antiarrhythmic therapy with sotalol.   She is scheduled for pulmonary vein isolation ablation with Dr. Seals on 3/17/2025.  We discussed ablation, <1-2% risk for complication, expected recovery, and follow-up.    She states that her questions were answered to her satisfaction and she is ready to proceed.  -- Hold sotalol 3 days prior to ablation.  It will likely be restarted for 6 to 12 weeks following ablation.    She has a [id number] score of 4 for age 65-74, female gender, HTN, T2DM.  HAS-BLED score of 1 for age.     -- Continue Xarelto 20 mg daily with full meal for stroke prophylaxis.  She reports no missed doses or bleeding issues.      2. Hypertension: Controlled    Telephone follow up with EP RN on 3/20/2025  Follow-up with me 4/28/2025, 6 weeks post PVI     History of Present Illness/Subjective    HPI: Cathy Randall is a 69 year old female who comes in for EP follow up of atrial fibrillation and history and physical prior to pulmonary vein isolation ablation.  She  has a history of persistent atrial fibrillation, hypertension, type 2 diabetes     Arrhythmia history  Dx/date: Atrial fibrillation diagnosed 3/14/2024, symptom onset approximately 1 month prior.  Converted to SR with initiation of sotalol.  Sx: Fatigue, diaphoresis, weakness, mild dyspnea on exertion  [id number] score: 4 for age 65-74, female gender, HTN, T2DM  Oral anticoagulation: Xarelto 20 mg daily  Antiarrhythmic medications, AV  pretty blocking agents: Sotalol 80 mg twice daily (3/21/2024, increased to 120 mg twice daily 5/23/2024)  Procedures  DCCV: 4/4/2024--canceled, arrived in SR  Ablation: Scheduled 3/17/2025 with Dr. Arnel Morgan states that she has been feeling well.  She uses her Kardia device to record AF episodes; they coccur 2-4 times a month lasting a couple of hours.  She denies chest discomfort, palpitations, abdominal fullness/bloating or peripheral edema, shortness of breath, paroxysmal nocturnal dyspnea, orthopnea, lightheadedness, dizziness, pre-syncope, or syncope.     Cardiographics (EKG personally reviewed):  EKG done 3/6/2025 shows sinus rhythm at 61 bpm, QRS 76 ms, QT/QTc 458/461 ms  EKG done 2/3/2025 shows sinus rhythm at 57 bpm, QRS 86 ms, QT/QTc 450/438 ms  EKG done 5/28/2024 shows sinus rhythm at 57 bpm, QRS 92 ms, QT/QTc 470/457 ms  EKG done 4/4/2024 shows sinus rhythm at 61 bpm, PACs, QRS 84 ms, QT/QTc 480/483 ms.     Kardia strips: Episodes of AF-RVR (VR 110s-120) occurring 3-4 times per month.     MCOT worn 3/29/2024 to 4/27/2024:  Predominant underlying rhythm was sinus rhythm, 46 to 159bpm, average 80 bpm.  Total atrial fibrillation and possible atrial flutter burden was 42%  There were no pauses noted.  Rare supraventricular ectopic beats (1%).  Rare premature ventricular contractions (2%).  Symptom triggers (1 event, other symptoms) correlated to atrial fibrillation.     ECHO done 4/4/2024:  The left ventricle is normal in size. There is normal left ventricular wall  thickness.  Left ventricular systolic function is normal. The visual ejection fraction is  55-60%. No regional wall motion abnormalities noted.     The right ventricle is normal in size and function.  Normal left atrial size. Right atrial size is normal.  There is mild (1+) mitral regurgitation.  Mild (35-45mmHg) pulmonary hypertension is present.  IVC diameter <2.1 cm collapsing >50% with sniff suggests a normal RA pressure  of 3  "mmHg.  There is no comparison study available.    I have reviewed and updated the patient's Past Medical History, Social History, Family History and Medication List.  Outside records personally reviewed.     Physical Examination  Review of Systems   Vitals: /70 (BP Location: Right arm, Patient Position: Sitting, Cuff Size: Adult Regular)   Pulse 61   Resp 16   Ht 1.626 m (5' 4\")   Wt 78.1 kg (172 lb 3.2 oz)   BMI 29.56 kg/m    BMI= Body mass index is 29.56 kg/m .  Wt Readings from Last 3 Encounters:   03/06/25 78.1 kg (172 lb 3.2 oz)   02/03/25 78 kg (172 lb)   07/25/24 75.8 kg (167 lb)       General Appearance:   Alert, well-appearing and in no acute distress.   HEENT: Atraumatic, normocephalic.  No scleral icterus, normal conjunctivae, EOMs intact, PERRL.  Mucous membranes pink and moist.     Chest/Lungs:   Chest symmetric, spine straight.  Respirations unlabored.  Lungs are clear to auscultation.   Cardiovascular:   Regular rate and rhythm.  Normal first and second heart sounds with no murmurs, rubs, or gallops; radial and posterior tibial pulses are intact, No edema.   Abdomen:  Soft, nondistended   Extremities: No cyanosis or clubbing.   Musculoskeletal: Moves all extremities.    Skin: Warm, dry, intact.    Neurologic: Mood and affect are appropriate.  Alert and oriented to person, place, time, and situation.     ROS: 10 point ROS neg other than the symptoms noted above in the HPI.         Medical History  Surgical History Family History Social History   Past Medical History:   Diagnosis Date     Benign essential hypertension 05/23/2024     Persistent atrial fibrillation (H) 03/21/2024     Type 2 diabetes mellitus (H) 05/23/2024     Past Surgical History:   Procedure Laterality Date     CATARACT EXTRACTION       WRIST FRACTURE SURGERY      Right 1990s, Left 2004     Family History   Problem Relation Age of Onset     Hypertension Mother      Diabetes Mother      Hypertension Father      Diabetes " Father      Breast Cancer No family hx of         Social History     Socioeconomic History     Marital status: Single     Spouse name: Not on file     Number of children: Not on file     Years of education: Not on file     Highest education level: Not on file   Occupational History     Not on file   Tobacco Use     Smoking status: Never     Smokeless tobacco: Not on file   Substance and Sexual Activity     Alcohol use: Not on file     Drug use: Not on file     Sexual activity: Not on file   Other Topics Concern     Not on file   Social History Narrative     Not on file     Social Drivers of Health     Financial Resource Strain: Not on file   Food Insecurity: Not on file   Transportation Needs: Not on file   Physical Activity: Not on file   Stress: Not on file   Social Connections: Not on file   Interpersonal Safety: Not on file   Housing Stability: Not on file           Medications  Allergies   Current Outpatient Medications   Medication Sig Dispense Refill     amLODIPine-benazepril (LOTREL) 5-40 MG capsule Take 1 capsule by mouth daily       atorvastatin (LIPITOR) 10 MG tablet Take 1 tablet by mouth daily       Calcium Carbonate-Vitamin D 600-5 MG-MCG TABS 2 tablet with a meal Orally Once a day       hydrochlorothiazide (HYDRODIURIL) 25 MG tablet TAKE 1 TABLET BY MOUTH IN THE MORNING ONCE A DAY AS NEEDED FOR FLUID RETENTION       metFORMIN (GLUCOPHAGE XR) 500 MG 24 hr tablet Take 500 mg by mouth 2 times daily (with meals). 2000 mg a day sometimes break it up       Multiple Vitamin (MULTIVITAMIN ADULT PO) Take 1 tablet by mouth daily       Multiple Vitamins-Minerals (PRESERVISION AREDS) TABS Take 1 tablet by mouth 2 times daily       ONETOUCH VERIO IQ test strip by In Vitro route daily       sotalol (BETAPACE) 80 MG tablet Take 1.5 tablets (120 mg) by mouth 2 times daily. 270 tablet 3     vitamin E (TOCOPHEROL) 400 units (180 mg) capsule Take 1 tablet by mouth daily       XARELTO ANTICOAGULANT 20 MG TABS tablet Take  20 mg by mouth daily         Allergies   Allergen Reactions     Aspirin Unknown     Doesn't take due to bleeding risk     Penicillins Unknown     Sulfa (Sulfonamide Antibiotics) [Sulfa Antibiotics] Unknown        Denies previous adverse reaction to anesthesia      Lab Results    Chemistry/lipid CBC Cardiac Enzymes/BNP/TSH/INR   Recent Labs   Lab Test 06/18/24  1034   CHOL 132   HDL 48*   LDL 53   TRIG 156*     Recent Labs   Lab Test 06/18/24  1034 06/06/23  0909 06/08/22  0921   LDL 53 104* 106     Recent Labs   Lab Test 06/18/24  1034      POTASSIUM 3.8   CHLORIDE 101   CO2 24   *   BUN 29.2*   CR 0.93   GFRESTIMATED 66   JOY 10.2     Recent Labs   Lab Test 06/18/24  1034 03/14/24  1122 06/06/23  0909   CR 0.93 1.08* 0.97*     BMP and CBC drawn today, results pending   CBC RESULTS:   Recent Labs   Lab Test 03/06/25  0950   WBC 8.7   RBC 4.94   HGB 15.8*   HCT 46.6   MCV 94   MCH 32.0   MCHC 33.9   RDW 12.3           Recent Labs   Lab Test 03/14/24  1122   TSH 3.89     Recent Labs   Lab Test 03/14/24  1122   INR 1.10      The longitudinal plan of care for the diagnosis(es)/condition(s) as documented were addressed during this visit. Due to the added complexity in care, I will continue to support Cathy in the subsequent management and with ongoing continuity of care.                                            Thank you for allowing me to participate in the care of your patient.      Sincerely,     JAZZMINE Vega CNP     [facility]  cc:   JAZZMINE Vega CNP  [address], [address]  [place] [address]

## 2025-03-06 NOTE — PROGRESS NOTES
Pt called in yesterday for insurance verification questions, she stated she reached out to her insurance company and prior auth has not been started yet.  A message was sent to the prior authorization team but we have not had a response yet.  I discussed with pt taht I believe it is typically started 1 week prior to procedure, but that we are not responsible for that side of the procedure so I was not 100% certain of the process.    Will forward another message over, pt is requesting a call from someone from their team with confirmation that prior auth was approved.    Anjana

## 2025-03-06 NOTE — PROGRESS NOTES
Pre-Procedure Pulmonary Vein Ablation (AF) Education    Procedure: PVI with Dr Seals on 3/17/25 with arrival time 7:30 am    COVID: Pt denies COVID like symptoms, and is aware if he/she develops COVID like symptoms they would need to complete an at home with a rapid antigen COVID test 1-2 days prior to your procedure date. If COVID + pt is aware the procedure will need to be rescheduled, and to contact CV scheduling as soon as possible    Type & Screen: Is not required for PVI Ablation    Pre-Op H&P: Completed today with EP LYNNETTE- See record in Epic    Education:   Reviewed with pt in Clinic today  Pre-Procedure Instruction: NPO after midnight pre procedure, Defined NPO, Remove all jewelry and leave all valuables at home, Shower prior to arrival, Anesthesia and intubation plan/orders, Intra-procedure PVI process, Post- PVI procedure expectations/recovery, Transportation requirements and arrangements post procedure, Post-procedure follow up process, Letter sent to pt via [employer] and mail with written instructions (Refer to letters tab), Lab results would be called to pt if abnormal  Risks:   Atrial Fibrillation Ablation/Left Atrial Ablation  Cardiac Ablation  <1% Hypotension, Hemorrhage, Thrombophlebitis, Systemic or pulmonic emboli, Cardiac perforation (tamponade), Infection, Pneumothorax, Arrhythmias, Proarrhythmic effects of drugs, Radiation exposure, Catheter entrapment  <1 % Vascular injury including perforation of vein, artery or heart  1-2% Tamponade and Aortic puncture with left sided transeptal approach  1% CVA   <1% MI  <0.1% death  If external defibrillation or CV is needed, 25% risk for superficial burn  Risks associated with general anesthesia will be addressed by the Anesthesiology Department  Radiofrequency Risks:  In addition to standard risks for Radiofrequency Ablation, there is:  <2% Significant pulmonary vein stenosis  <2% Embolic events  <1% Esophageal fistula  <1% Phrenic nerve paralysis    Cryoablation Risks:  In addition to standard risks for Cryoablation Ablation, there is:  <1% Phrenic nerve paralysis  <1% Pulmonary vein stenosis  <1% Esophageal fistula    Medication:   Instructions regarding anticoagulants: Xarelto- Continue anticoagulation uninterrupted through their procedure, do not miss any doses of AC prior to procedure, importance of taking AC for stroke prevention, taking AC as prescribed, to call prior to PVI if missed a dose of AC, and if upon arrival pt reports missing a dose of AC PVI will potentially be cnx/postponed  Instructions given to pt regarding antiarrhythmic medication: Sotalol- Hold 3 days prior to procedure  Instructions given to pt regarding PPI medication: NA- PFA Ablation  Instructions given to pt regarding diuretics medication: Hold oral diuretics AM of procedure  Instructions given to pt regarding DM/GLP-1 medication:   DM- Hold all oral diabetic medications and short acting insulin the morning of the procedure, and take 1/2 of pts scheduled doses of long acting insulin the PM prior and/or AM of procedure  GLP-1- None  Instructions for medication, other than anticoagulants and antiarrhythmics listed above, given to pt: Take all medication AM of procedure with small sips of water     Important patient information for staff: None    3/6/2025 9:39 AM  Anjana Ashford RN

## 2025-03-06 NOTE — PROGRESS NOTES
HEART CARE ELECTROPHYSIOLOGY NOTE      [facility]  [phone]      Assessment/Recommendations   Assessment/Plan:  1.  Persistent atrial fibrillation/Stage 3B:  Initially diagnosed 3/14/2024.  Symptoms consist of fatigue, diaphoresis, weakness, mild dyspnea on exertion .  Failed antiarrhythmic therapy with sotalol.   She is scheduled for pulmonary vein isolation ablation with Dr. Seals on 3/17/2025.  We discussed ablation, <1-2% risk for complication, expected recovery, and follow-up.    She states that her questions were answered to her satisfaction and she is ready to proceed.  -- Hold sotalol 3 days prior to ablation.  It will likely be restarted for 6 to 12 weeks following ablation.    She has a [id number] score of 4 for age 65-74, female gender, HTN, T2DM.  HAS-BLED score of 1 for age.     -- Continue Xarelto 20 mg daily with full meal for stroke prophylaxis.  She reports no missed doses or bleeding issues.      2. Hypertension: Controlled    Telephone follow up with EP RN on 3/20/2025  Follow-up with me 4/28/2025, 6 weeks post PVI     History of Present Illness/Subjective    HPI: Cathy Randall is a 69 year old female who comes in for EP follow up of atrial fibrillation and history and physical prior to pulmonary vein isolation ablation.  She  has a history of persistent atrial fibrillation, hypertension, type 2 diabetes     Arrhythmia history  Dx/date: Atrial fibrillation diagnosed 3/14/2024, symptom onset approximately 1 month prior.  Converted to SR with initiation of sotalol.  Sx: Fatigue, diaphoresis, weakness, mild dyspnea on exertion  [id number] score: 4 for age 65-74, female gender, HTN, T2DM  Oral anticoagulation: Xarelto 20 mg daily  Antiarrhythmic medications, AV pretty blocking agents: Sotalol 80 mg twice daily (3/21/2024, increased to 120 mg twice daily 5/23/2024)  Procedures  DCCV: 4/4/2024--canceled, arrived in SR  Ablation: Scheduled 3/17/2025 with Dr. Seals      Cathy states that she has been feeling well.  She uses her Kardia device to record AF episodes; they coccur 2-4 times a month lasting a couple of hours.  She denies chest discomfort, palpitations, abdominal fullness/bloating or peripheral edema, shortness of breath, paroxysmal nocturnal dyspnea, orthopnea, lightheadedness, dizziness, pre-syncope, or syncope.     Cardiographics (EKG personally reviewed):  EKG done 3/6/2025 shows sinus rhythm at 61 bpm, QRS 76 ms, QT/QTc 458/461 ms  EKG done 2/3/2025 shows sinus rhythm at 57 bpm, QRS 86 ms, QT/QTc 450/438 ms  EKG done 5/28/2024 shows sinus rhythm at 57 bpm, QRS 92 ms, QT/QTc 470/457 ms  EKG done 4/4/2024 shows sinus rhythm at 61 bpm, PACs, QRS 84 ms, QT/QTc 480/483 ms.     Kardia strips: Episodes of AF-RVR (VR 110s-120) occurring 3-4 times per month.     MCOT worn 3/29/2024 to 4/27/2024:  Predominant underlying rhythm was sinus rhythm, 46 to 159bpm, average 80 bpm.  Total atrial fibrillation and possible atrial flutter burden was 42%  There were no pauses noted.  Rare supraventricular ectopic beats (1%).  Rare premature ventricular contractions (2%).  Symptom triggers (1 event, other symptoms) correlated to atrial fibrillation.     ECHO done 4/4/2024:  The left ventricle is normal in size. There is normal left ventricular wall  thickness.  Left ventricular systolic function is normal. The visual ejection fraction is  55-60%. No regional wall motion abnormalities noted.     The right ventricle is normal in size and function.  Normal left atrial size. Right atrial size is normal.  There is mild (1+) mitral regurgitation.  Mild (35-45mmHg) pulmonary hypertension is present.  IVC diameter <2.1 cm collapsing >50% with sniff suggests a normal RA pressure  of 3 mmHg.  There is no comparison study available.    I have reviewed and updated the patient's Past Medical History, Social History, Family History and Medication List.  Outside records personally reviewed.  "    Physical Examination  Review of Systems   Vitals: /70 (BP Location: Right arm, Patient Position: Sitting, Cuff Size: Adult Regular)   Pulse 61   Resp 16   Ht 1.626 m (5' 4\")   Wt 78.1 kg (172 lb 3.2 oz)   BMI 29.56 kg/m    BMI= Body mass index is 29.56 kg/m .  Wt Readings from Last 3 Encounters:   03/06/25 78.1 kg (172 lb 3.2 oz)   02/03/25 78 kg (172 lb)   07/25/24 75.8 kg (167 lb)       General Appearance:   Alert, well-appearing and in no acute distress.   HEENT: Atraumatic, normocephalic.  No scleral icterus, normal conjunctivae, EOMs intact, PERRL.  Mucous membranes pink and moist.     Chest/Lungs:   Chest symmetric, spine straight.  Respirations unlabored.  Lungs are clear to auscultation.   Cardiovascular:   Regular rate and rhythm.  Normal first and second heart sounds with no murmurs, rubs, or gallops; radial and posterior tibial pulses are intact, No edema.   Abdomen:  Soft, nondistended   Extremities: No cyanosis or clubbing.   Musculoskeletal: Moves all extremities.    Skin: Warm, dry, intact.    Neurologic: Mood and affect are appropriate.  Alert and oriented to person, place, time, and situation.     ROS: 10 point ROS neg other than the symptoms noted above in the HPI.         Medical History  Surgical History Family History Social History   Past Medical History:   Diagnosis Date    Benign essential hypertension 05/23/2024    Persistent atrial fibrillation (H) 03/21/2024    Type 2 diabetes mellitus (H) 05/23/2024     Past Surgical History:   Procedure Laterality Date    CATARACT EXTRACTION      WRIST FRACTURE SURGERY      Right 1990s, Left 2004     Family History   Problem Relation Age of Onset    Hypertension Mother     Diabetes Mother     Hypertension Father     Diabetes Father     Breast Cancer No family hx of         Social History     Socioeconomic History    Marital status: Single     Spouse name: Not on file    Number of children: Not on file    Years of education: Not on file    " Highest education level: Not on file   Occupational History    Not on file   Tobacco Use    Smoking status: Never    Smokeless tobacco: Not on file   Substance and Sexual Activity    Alcohol use: Not on file    Drug use: Not on file    Sexual activity: Not on file   Other Topics Concern    Not on file   Social History Narrative    Not on file     Social Drivers of Health     Financial Resource Strain: Not on file   Food Insecurity: Not on file   Transportation Needs: Not on file   Physical Activity: Not on file   Stress: Not on file   Social Connections: Not on file   Interpersonal Safety: Not on file   Housing Stability: Not on file           Medications  Allergies   Current Outpatient Medications   Medication Sig Dispense Refill    amLODIPine-benazepril (LOTREL) 5-40 MG capsule Take 1 capsule by mouth daily      atorvastatin (LIPITOR) 10 MG tablet Take 1 tablet by mouth daily      Calcium Carbonate-Vitamin D 600-5 MG-MCG TABS 2 tablet with a meal Orally Once a day      hydrochlorothiazide (HYDRODIURIL) 25 MG tablet TAKE 1 TABLET BY MOUTH IN THE MORNING ONCE A DAY AS NEEDED FOR FLUID RETENTION      metFORMIN (GLUCOPHAGE XR) 500 MG 24 hr tablet Take 500 mg by mouth 2 times daily (with meals). 2000 mg a day sometimes break it up      Multiple Vitamin (MULTIVITAMIN ADULT PO) Take 1 tablet by mouth daily      Multiple Vitamins-Minerals (PRESERVISION AREDS) TABS Take 1 tablet by mouth 2 times daily      ONETOUCH VERIO IQ test strip by In Vitro route daily      sotalol (BETAPACE) 80 MG tablet Take 1.5 tablets (120 mg) by mouth 2 times daily. 270 tablet 3    vitamin E (TOCOPHEROL) 400 units (180 mg) capsule Take 1 tablet by mouth daily      XARELTO ANTICOAGULANT 20 MG TABS tablet Take 20 mg by mouth daily         Allergies   Allergen Reactions    Aspirin Unknown     Doesn't take due to bleeding risk    Penicillins Unknown    Sulfa (Sulfonamide Antibiotics) [Sulfa Antibiotics] Unknown        Denies previous adverse  reaction to anesthesia      Lab Results    Chemistry/lipid CBC Cardiac Enzymes/BNP/TSH/INR   Recent Labs   Lab Test 06/18/24  1034   CHOL 132   HDL 48*   LDL 53   TRIG 156*     Recent Labs   Lab Test 06/18/24  1034 06/06/23  0909 06/08/22  0921   LDL 53 104* 106     Recent Labs   Lab Test 06/18/24  1034      POTASSIUM 3.8   CHLORIDE 101   CO2 24   *   BUN 29.2*   CR 0.93   GFRESTIMATED 66   JOY 10.2     Recent Labs   Lab Test 06/18/24  1034 03/14/24  1122 06/06/23  0909   CR 0.93 1.08* 0.97*     BMP and CBC drawn today, results pending   CBC RESULTS:   Recent Labs   Lab Test 03/06/25  0950   WBC 8.7   RBC 4.94   HGB 15.8*   HCT 46.6   MCV 94   MCH 32.0   MCHC 33.9   RDW 12.3           Recent Labs   Lab Test 03/14/24  1122   TSH 3.89     Recent Labs   Lab Test 03/14/24  1122   INR 1.10      The longitudinal plan of care for the diagnosis(es)/condition(s) as documented were addressed during this visit. Due to the added complexity in care, I will continue to support Cathy in the subsequent management and with ongoing continuity of care.

## 2025-03-07 LAB
ATRIAL RATE - MUSE: 61 BPM
DIASTOLIC BLOOD PRESSURE - MUSE: NORMAL MMHG
INTERPRETATION ECG - MUSE: NORMAL
P AXIS - MUSE: 58 DEGREES
PR INTERVAL - MUSE: 140 MS
QRS DURATION - MUSE: 76 MS
QT - MUSE: 458 MS
QTC - MUSE: 461 MS
R AXIS - MUSE: 19 DEGREES
SYSTOLIC BLOOD PRESSURE - MUSE: NORMAL MMHG
T AXIS - MUSE: 35 DEGREES
VENTRICULAR RATE- MUSE: 61 BPM

## 2025-03-15 ENCOUNTER — HEALTH MAINTENANCE LETTER (OUTPATIENT)
Age: 70
End: 2025-03-15

## 2025-03-17 ENCOUNTER — ANESTHESIA (OUTPATIENT)
Dept: CARDIOLOGY | Facility: HOSPITAL | Age: 70
End: 2025-03-17
Payer: COMMERCIAL

## 2025-03-17 ENCOUNTER — ANESTHESIA EVENT (OUTPATIENT)
Dept: CARDIOLOGY | Facility: HOSPITAL | Age: 70
End: 2025-03-17
Payer: COMMERCIAL

## 2025-03-17 ENCOUNTER — HOSPITAL ENCOUNTER (OUTPATIENT)
Facility: HOSPITAL | Age: 70
Discharge: HOME | End: 2025-03-17
Attending: INTERNAL MEDICINE | Admitting: INTERNAL MEDICINE
Payer: COMMERCIAL

## 2025-03-17 VITALS
OXYGEN SATURATION: 95 % | WEIGHT: 172.2 LBS | DIASTOLIC BLOOD PRESSURE: 82 MMHG | HEART RATE: 91 BPM | SYSTOLIC BLOOD PRESSURE: 136 MMHG | RESPIRATION RATE: 20 BRPM | BODY MASS INDEX: 29.4 KG/M2 | TEMPERATURE: 98.2 F | HEIGHT: 64 IN

## 2025-03-17 DIAGNOSIS — I48.19 PERSISTENT ATRIAL FIBRILLATION (H): ICD-10-CM

## 2025-03-17 LAB
ACT BLD: 678 SECONDS (ref 74–150)
ANION GAP SERPL CALCULATED.3IONS-SCNC: 13 MMOL/L (ref 7–15)
ATRIAL RATE - MUSE: 77 BPM
BUN SERPL-MCNC: 20.9 MG/DL (ref 8–23)
CALCIUM SERPL-MCNC: 10.3 MG/DL (ref 8.8–10.4)
CHLORIDE SERPL-SCNC: 102 MMOL/L (ref 98–107)
CREAT SERPL-MCNC: 0.83 MG/DL (ref 0.51–0.95)
DIASTOLIC BLOOD PRESSURE - MUSE: NORMAL MMHG
EGFRCR SERPLBLD CKD-EPI 2021: 75 ML/MIN/1.73M2
ERYTHROCYTE [DISTWIDTH] IN BLOOD BY AUTOMATED COUNT: 12.4 % (ref 10–15)
GLUCOSE BLDC GLUCOMTR-MCNC: 155 MG/DL (ref 70–99)
GLUCOSE SERPL-MCNC: 274 MG/DL (ref 70–99)
HCO3 SERPL-SCNC: 23 MMOL/L (ref 22–29)
HCT VFR BLD AUTO: 48.4 % (ref 35–47)
HGB BLD-MCNC: 17 G/DL (ref 11.7–15.7)
INTERPRETATION ECG - MUSE: NORMAL
MCH RBC QN AUTO: 33.3 PG (ref 26.5–33)
MCHC RBC AUTO-ENTMCNC: 35.1 G/DL (ref 31.5–36.5)
MCV RBC AUTO: 95 FL (ref 78–100)
P AXIS - MUSE: 78 DEGREES
PLATELET # BLD AUTO: 262 10E3/UL (ref 150–450)
POTASSIUM SERPL-SCNC: 3.8 MMOL/L (ref 3.4–5.3)
PR INTERVAL - MUSE: 152 MS
QRS DURATION - MUSE: 82 MS
QT - MUSE: 388 MS
QTC - MUSE: 439 MS
R AXIS - MUSE: 67 DEGREES
RBC # BLD AUTO: 5.11 10E6/UL (ref 3.8–5.2)
SODIUM SERPL-SCNC: 138 MMOL/L (ref 135–145)
SYSTOLIC BLOOD PRESSURE - MUSE: NORMAL MMHG
T AXIS - MUSE: 53 DEGREES
VENTRICULAR RATE- MUSE: 77 BPM
WBC # BLD AUTO: 11.7 10E3/UL (ref 4–11)

## 2025-03-17 PROCEDURE — 85041 AUTOMATED RBC COUNT: CPT | Performed by: INTERNAL MEDICINE

## 2025-03-17 PROCEDURE — 82565 ASSAY OF CREATININE: CPT | Performed by: INTERNAL MEDICINE

## 2025-03-17 PROCEDURE — 36415 COLL VENOUS BLD VENIPUNCTURE: CPT | Performed by: INTERNAL MEDICINE

## 2025-03-17 PROCEDURE — 999N000023 HC STATISTIC BRONCH IN ENDO (20 MIN) THERAPIST TIME

## 2025-03-17 PROCEDURE — C1769 GUIDE WIRE: HCPCS | Performed by: INTERNAL MEDICINE

## 2025-03-17 PROCEDURE — 85018 HEMOGLOBIN: CPT | Performed by: INTERNAL MEDICINE

## 2025-03-17 PROCEDURE — 93656 COMPRE EP EVAL ABLTJ ATR FIB: CPT | Performed by: INTERNAL MEDICINE

## 2025-03-17 PROCEDURE — 258N000003 HC RX IP 258 OP 636: Performed by: INTERNAL MEDICINE

## 2025-03-17 PROCEDURE — C1730 CATH, EP, 19 OR FEW ELECT: HCPCS | Performed by: INTERNAL MEDICINE

## 2025-03-17 PROCEDURE — 250N000009 HC RX 250: Performed by: NURSE ANESTHETIST, CERTIFIED REGISTERED

## 2025-03-17 PROCEDURE — 710N000010 HC RECOVERY PHASE 1, LEVEL 2, PER MIN

## 2025-03-17 PROCEDURE — 272N000001 HC OR GENERAL SUPPLY STERILE: Performed by: INTERNAL MEDICINE

## 2025-03-17 PROCEDURE — 80048 BASIC METABOLIC PNL TOTAL CA: CPT | Performed by: INTERNAL MEDICINE

## 2025-03-17 PROCEDURE — 93010 ELECTROCARDIOGRAM REPORT: CPT | Performed by: INTERNAL MEDICINE

## 2025-03-17 PROCEDURE — C1887 CATHETER, GUIDING: HCPCS | Performed by: INTERNAL MEDICINE

## 2025-03-17 PROCEDURE — 82962 GLUCOSE BLOOD TEST: CPT

## 2025-03-17 PROCEDURE — 250N000009 HC RX 250: Performed by: INTERNAL MEDICINE

## 2025-03-17 PROCEDURE — 93657 TX L/R ATRIAL FIB ADDL: CPT | Performed by: INTERNAL MEDICINE

## 2025-03-17 PROCEDURE — 250N000011 HC RX IP 250 OP 636: Performed by: INTERNAL MEDICINE

## 2025-03-17 PROCEDURE — C1732 CATH, EP, DIAG/ABL, 3D/VECT: HCPCS | Performed by: INTERNAL MEDICINE

## 2025-03-17 PROCEDURE — 85347 COAGULATION TIME ACTIVATED: CPT

## 2025-03-17 PROCEDURE — C1766 INTRO/SHEATH,STRBLE,NON-PEEL: HCPCS | Performed by: INTERNAL MEDICINE

## 2025-03-17 PROCEDURE — 93623 PRGRMD STIMJ&PACG IV RX NFS: CPT | Mod: 26 | Performed by: INTERNAL MEDICINE

## 2025-03-17 PROCEDURE — 250N000011 HC RX IP 250 OP 636: Performed by: NURSE ANESTHETIST, CERTIFIED REGISTERED

## 2025-03-17 PROCEDURE — C1759 CATH, INTRA ECHOCARDIOGRAPHY: HCPCS | Performed by: INTERNAL MEDICINE

## 2025-03-17 PROCEDURE — 93623 PRGRMD STIMJ&PACG IV RX NFS: CPT | Performed by: INTERNAL MEDICINE

## 2025-03-17 PROCEDURE — 370N000017 HC ANESTHESIA TECHNICAL FEE, PER MIN: Performed by: INTERNAL MEDICINE

## 2025-03-17 PROCEDURE — C1733 CATH, EP, OTHR THAN COOL-TIP: HCPCS | Performed by: INTERNAL MEDICINE

## 2025-03-17 PROCEDURE — P9045 ALBUMIN (HUMAN), 5%, 250 ML: HCPCS | Performed by: NURSE ANESTHETIST, CERTIFIED REGISTERED

## 2025-03-17 PROCEDURE — 258N000003 HC RX IP 258 OP 636: Performed by: NURSE ANESTHETIST, CERTIFIED REGISTERED

## 2025-03-17 PROCEDURE — C1894 INTRO/SHEATH, NON-LASER: HCPCS | Performed by: INTERNAL MEDICINE

## 2025-03-17 PROCEDURE — 93005 ELECTROCARDIOGRAM TRACING: CPT

## 2025-03-17 PROCEDURE — 82310 ASSAY OF CALCIUM: CPT | Performed by: INTERNAL MEDICINE

## 2025-03-17 RX ORDER — ONDANSETRON 2 MG/ML
4 INJECTION INTRAMUSCULAR; INTRAVENOUS EVERY 6 HOURS PRN
Status: DISCONTINUED | OUTPATIENT
Start: 2025-03-17 | End: 2025-03-17 | Stop reason: HOSPADM

## 2025-03-17 RX ORDER — SODIUM CHLORIDE 9 MG/ML
100 INJECTION, SOLUTION INTRAVENOUS CONTINUOUS
Status: DISCONTINUED | OUTPATIENT
Start: 2025-03-17 | End: 2025-03-17 | Stop reason: HOSPADM

## 2025-03-17 RX ORDER — LIDOCAINE HYDROCHLORIDE AND EPINEPHRINE 10; 10 MG/ML; UG/ML
INJECTION, SOLUTION INFILTRATION; PERINEURAL
Status: DISCONTINUED | OUTPATIENT
Start: 2025-03-17 | End: 2025-03-17 | Stop reason: HOSPADM

## 2025-03-17 RX ORDER — SODIUM CHLORIDE 9 MG/ML
INJECTION, SOLUTION INTRAVENOUS CONTINUOUS PRN
Status: DISCONTINUED | OUTPATIENT
Start: 2025-03-17 | End: 2025-03-17

## 2025-03-17 RX ORDER — BUPIVACAINE HYDROCHLORIDE 2.5 MG/ML
INJECTION, SOLUTION EPIDURAL; INFILTRATION; INTRACAUDAL; PERINEURAL
Status: DISCONTINUED | OUTPATIENT
Start: 2025-03-17 | End: 2025-03-17 | Stop reason: HOSPADM

## 2025-03-17 RX ORDER — ACETAMINOPHEN 325 MG/1
650 TABLET ORAL EVERY 4 HOURS PRN
Status: DISCONTINUED | OUTPATIENT
Start: 2025-03-17 | End: 2025-03-17 | Stop reason: HOSPADM

## 2025-03-17 RX ORDER — LIDOCAINE 40 MG/G
CREAM TOPICAL
Status: DISCONTINUED | OUTPATIENT
Start: 2025-03-17 | End: 2025-03-17 | Stop reason: HOSPADM

## 2025-03-17 RX ORDER — PROPOFOL 10 MG/ML
INJECTION, EMULSION INTRAVENOUS PRN
Status: DISCONTINUED | OUTPATIENT
Start: 2025-03-17 | End: 2025-03-17

## 2025-03-17 RX ORDER — IBUPROFEN 600 MG/1
600 TABLET, FILM COATED ORAL EVERY 6 HOURS PRN
Status: DISCONTINUED | OUTPATIENT
Start: 2025-03-17 | End: 2025-03-17 | Stop reason: HOSPADM

## 2025-03-17 RX ORDER — LIDOCAINE HYDROCHLORIDE 10 MG/ML
INJECTION, SOLUTION INFILTRATION; PERINEURAL PRN
Status: DISCONTINUED | OUTPATIENT
Start: 2025-03-17 | End: 2025-03-17

## 2025-03-17 RX ORDER — PROTAMINE SULFATE 10 MG/ML
INJECTION, SOLUTION INTRAVENOUS PRN
Status: DISCONTINUED | OUTPATIENT
Start: 2025-03-17 | End: 2025-03-17

## 2025-03-17 RX ORDER — ONDANSETRON 2 MG/ML
INJECTION INTRAMUSCULAR; INTRAVENOUS PRN
Status: DISCONTINUED | OUTPATIENT
Start: 2025-03-17 | End: 2025-03-17

## 2025-03-17 RX ORDER — FENTANYL CITRATE 50 UG/ML
INJECTION, SOLUTION INTRAMUSCULAR; INTRAVENOUS PRN
Status: DISCONTINUED | OUTPATIENT
Start: 2025-03-17 | End: 2025-03-17

## 2025-03-17 RX ORDER — HEPARIN SODIUM 1000 [USP'U]/ML
INJECTION, SOLUTION INTRAVENOUS; SUBCUTANEOUS
Status: DISCONTINUED | OUTPATIENT
Start: 2025-03-17 | End: 2025-03-17 | Stop reason: HOSPADM

## 2025-03-17 RX ORDER — HEPARIN SODIUM 10000 [USP'U]/100ML
INJECTION, SOLUTION INTRAVENOUS CONTINUOUS PRN
Status: DISCONTINUED | OUTPATIENT
Start: 2025-03-17 | End: 2025-03-17 | Stop reason: HOSPADM

## 2025-03-17 RX ORDER — ONDANSETRON 4 MG/1
4 TABLET, ORALLY DISINTEGRATING ORAL EVERY 6 HOURS PRN
Status: DISCONTINUED | OUTPATIENT
Start: 2025-03-17 | End: 2025-03-17 | Stop reason: HOSPADM

## 2025-03-17 RX ADMIN — PHENYLEPHRINE HYDROCHLORIDE 0.3 MCG/KG/MIN: 10 INJECTION INTRAVENOUS at 10:37

## 2025-03-17 RX ADMIN — SODIUM CHLORIDE 100 ML/HR: 0.9 INJECTION, SOLUTION INTRAVENOUS at 08:20

## 2025-03-17 RX ADMIN — SUGAMMADEX 200 MG: 100 INJECTION, SOLUTION INTRAVENOUS at 11:37

## 2025-03-17 RX ADMIN — SODIUM CHLORIDE: 9 INJECTION, SOLUTION INTRAVENOUS at 11:17

## 2025-03-17 RX ADMIN — PROPOFOL 200 MG: 10 INJECTION, EMULSION INTRAVENOUS at 10:28

## 2025-03-17 RX ADMIN — ONDANSETRON 4 MG: 2 INJECTION INTRAMUSCULAR; INTRAVENOUS at 11:30

## 2025-03-17 RX ADMIN — SODIUM CHLORIDE: 9 INJECTION, SOLUTION INTRAVENOUS at 10:19

## 2025-03-17 RX ADMIN — LIDOCAINE HYDROCHLORIDE 5 ML: 10 INJECTION, SOLUTION INFILTRATION; PERINEURAL at 10:28

## 2025-03-17 RX ADMIN — ROCURONIUM 10 MG: 50 INJECTION, SOLUTION INTRAVENOUS at 11:24

## 2025-03-17 RX ADMIN — PHENYLEPHRINE HYDROCHLORIDE 100 MCG: 10 INJECTION INTRAVENOUS at 11:04

## 2025-03-17 RX ADMIN — FENTANYL CITRATE 100 MCG: 50 INJECTION, SOLUTION INTRAMUSCULAR; INTRAVENOUS at 10:28

## 2025-03-17 RX ADMIN — PHENYLEPHRINE HYDROCHLORIDE 100 MCG: 10 INJECTION INTRAVENOUS at 10:39

## 2025-03-17 RX ADMIN — ROCURONIUM 10 MG: 50 INJECTION, SOLUTION INTRAVENOUS at 11:27

## 2025-03-17 RX ADMIN — ROCURONIUM 50 MG: 50 INJECTION, SOLUTION INTRAVENOUS at 10:29

## 2025-03-17 RX ADMIN — ALBUMIN HUMAN: 0.05 INJECTION, SOLUTION INTRAVENOUS at 10:48

## 2025-03-17 RX ADMIN — PROTAMINE SULFATE 50 MG: 10 INJECTION, SOLUTION INTRAVENOUS at 11:33

## 2025-03-17 RX ADMIN — ROCURONIUM 10 MG: 50 INJECTION, SOLUTION INTRAVENOUS at 11:15

## 2025-03-17 RX ADMIN — PHENYLEPHRINE HYDROCHLORIDE 100 MCG: 10 INJECTION INTRAVENOUS at 10:34

## 2025-03-17 ASSESSMENT — ACTIVITIES OF DAILY LIVING (ADL)
ADLS_ACUITY_SCORE: 41

## 2025-03-17 NOTE — ANESTHESIA CARE TRANSFER NOTE
Patient: Cathy Randall    Procedure: Procedure(s):  Ablation Atrial Fibrillation       Diagnosis: Persistent Atrial Fibrillation  Diagnosis Additional Information: No value filed.    Anesthesia Type:   General     Note:    Oropharynx: oropharynx clear of all foreign objects and spontaneously breathing  Level of Consciousness: awake  Oxygen Supplementation: room air    Independent Airway: airway patency satisfactory and stable  Dentition: dentition unchanged  Vital Signs Stable: post-procedure vital signs reviewed and stable  Report to RN Given: handoff report given  Patient transferred to: Cardiac Special Care        Vitals:  Vitals Value Taken Time   /65 03/17/25 1146   Temp 98.2 03/17/25 1146   Pulse 87 03/17/25 1146   Resp 21 03/17/25 1146   SpO2 96 % 03/17/25 1146   Vitals shown include unfiled device data.    Electronically Signed By: JAZZMINE Darden CRNA  March 17, 2025  11:48 AM

## 2025-03-17 NOTE — CARE PLAN
Pt dc'ed to home via wheelchair, denies any pain, right and left groins are dry and intact, no hematoma noted.

## 2025-03-17 NOTE — ANESTHESIA PROCEDURE NOTES
Airway       Patient location during procedure: OR       Procedure Start/Stop Times: 3/17/2025 10:31 AM  Staff -        CRNA: Naomi Wells APRN CRNA       Performed By: CRNA  Consent for Airway        Urgency: elective  Indications and Patient Condition       Indications for airway management: glenn-procedural       Induction type:intravenous       Mask difficulty assessment: 2 - vent by mask + OA or adjuvant +/- NMBA    Final Airway Details       Final airway type: endotracheal airway       Successful airway: ETT - single and Oral  Endotracheal Airway Details        ETT size (mm): 7.0       Cuffed: yes       Cuff volume (mL): 5       Successful intubation technique: video laryngoscopy       VL Blade Size: Glidescope 4       Grade View of Cords: 1       Adjucts: stylet       Position: Right       Measured from: lips       Secured at (cm): 20       Bite block used: Soft    Post intubation assessment        Placement verified by: capnometry, equal breath sounds and chest rise        Number of attempts at approach: 1       Number of other approaches attempted: 0       Secured with: tape       Ease of procedure: easy       Dentition: Intact and Unchanged    Medication(s) Administered   Medication Administration Time: 3/17/2025 10:31 AM

## 2025-03-17 NOTE — Clinical Note
Arrhythmia Type: atrial fibrillation.   Method of Cardioversion: synchronous.   The arrhythmia was terminated.   Energy shock delivered: 200 joules.   Time shock delivered: 10:56 CDT.

## 2025-03-17 NOTE — Clinical Note
- Resistant to Levaquin, discontinued  - Started ceftriaxone after discussing with son.  Patient's chart states she is allergic to PCN and cephalexin, but he reports she has never had a reaction that he is aware of.  No problems overnight on ceftriaxone.  - Blood culture also growing Proteus.     Sheath prepped and inserted in the left femoral vein.

## 2025-03-17 NOTE — PLAN OF CARE
Goal Outcome Evaluation:         Pt admitted for PVI due to her hx of atrial fibrillation. Pt prepped and ready for procedure. Pt's friend Venus is here for support.      Tessa Jamison RN

## 2025-03-17 NOTE — INTERVAL H&P NOTE
"I have reviewed the surgical (or preoperative) H&P that is linked to this encounter, and examined the patient. There are no significant changes    Clinical Conditions Present on Arrival:  Clinically Significant Risk Factors Present on Admission                 # Drug Induced Coagulation Defect: home medication list includes an anticoagulant medication       # Overweight: Estimated body mass index is 29.56 kg/m  as calculated from the following:    Height as of this encounter: 1.626 m (5' 4\").    Weight as of this encounter: 78.1 kg (172 lb 3.2 oz).       "

## 2025-03-17 NOTE — ANESTHESIA PREPROCEDURE EVALUATION
Anesthesia Pre-Procedure Evaluation    Patient: Cathy Randall   MRN: [id number] : 1955        Procedure : Procedure(s):  Ablation Atrial Fibrillation          Past Medical History:   Diagnosis Date    Benign essential hypertension 2024    Persistent atrial fibrillation (H) 2024    Type 2 diabetes mellitus (H) 2024      Past Surgical History:   Procedure Laterality Date    CATARACT EXTRACTION      WRIST FRACTURE SURGERY      Right , Left       Allergies   Allergen Reactions    Aspirin Unknown     Doesn't take due to bleeding risk    Penicillins Unknown    Sulfa (Sulfonamide Antibiotics) [Sulfa Antibiotics] Unknown      Social History     Tobacco Use    Smoking status: Never    Smokeless tobacco: Not on file   Substance Use Topics    Alcohol use: Not on file      Wt Readings from Last 1 Encounters:   25 78.1 kg (172 lb 3.2 oz)        Anesthesia Evaluation            ROS/MED HX  ENT/Pulmonary:       Neurologic:       Cardiovascular:     (+)  hypertension- -   -  - -                                      METS/Exercise Tolerance:     Hematologic:       Musculoskeletal:       GI/Hepatic:       Renal/Genitourinary:       Endo:     (+) type I DM,                     Psychiatric/Substance Use:       Infectious Disease:       Malignancy:       Other:            Physical Exam    Airway        Mallampati: III   TM distance: > 3 FB    Mouth opening: > 3 cm    Respiratory Devices and Support         Dental    unable to assess        Cardiovascular   cardiovascular exam normal          Pulmonary   pulmonary exam normal                OUTSIDE LABS:  CBC:   Lab Results   Component Value Date    WBC 8.7 2025    HGB 15.8 (H) 2025    HCT 46.6 2025     2025     BMP:   Lab Results   Component Value Date     2025     2024    POTASSIUM 4.5 2025    POTASSIUM 3.8 2024    CHLORIDE 100 2025    CHLORIDE 101 2024    CO2 27  "03/06/2025    CO2 24 06/18/2024    BUN 18.6 03/06/2025    BUN 29.2 (H) 06/18/2024    CR 0.79 03/06/2025    CR 0.93 06/18/2024     (H) 03/06/2025     (H) 06/18/2024     COAGS:   Lab Results   Component Value Date    INR 1.10 03/14/2024     POC: No results found for: \"BGM\", \"HCG\", \"HCGS\"  HEPATIC:   Lab Results   Component Value Date    ALBUMIN 4.4 06/18/2024    PROTTOTAL 7.4 06/18/2024    ALT 30 06/18/2024    AST 22 06/18/2024    ALKPHOS 49 06/18/2024    BILITOTAL 0.9 06/18/2024     OTHER:   Lab Results   Component Value Date    JOY 10.4 03/06/2025    TSH 3.89 03/14/2024       Anesthesia Plan    ASA Status:  3       Anesthesia Type: General.     - Airway: ETT   Induction: Intravenous.   Maintenance: Inhalation.        Consents    Anesthesia Plan(s) and associated risks, benefits, and realistic alternatives discussed. Questions answered and patient/representative(s) expressed understanding.     - Discussed: Risks, Benefits and Alternatives for BOTH SEDATION and the PROCEDURE were discussed     - Discussed with:       - Extended Intubation/Ventilatory Support Discussed: No.      - Patient is DNR/DNI Status: No     Use of blood products discussed: No .     Postoperative Care    Pain management: IV analgesics.        Comments:               John Lindsey MD    I have reviewed the pertinent notes and labs in the chart from the past 30 days and (re)examined the patient.  Any updates or changes from those notes are reflected in this note.    Clinically Significant Risk Factors Present on Admission                # Drug Induced Coagulation Defect: home medication list includes an anticoagulant medication    # Hypertension: Noted on problem list           # Overweight: Estimated body mass index is 29.56 kg/m  as calculated from the following:    Height as of 3/6/25: 1.626 m (5' 4\").    Weight as of 3/6/25: 78.1 kg (172 lb 3.2 oz).                "

## 2025-03-17 NOTE — ANESTHESIA POSTPROCEDURE EVALUATION
Patient: Cathy Randall    Procedure: Procedure(s):  Ablation Atrial Fibrillation       Anesthesia Type:  General    Note:  Disposition: Outpatient   Postop Pain Control: Uneventful            Sign Out: Well controlled pain   PONV: No   Neuro/Psych: Uneventful            Sign Out: Acceptable/Baseline neuro status   Airway/Respiratory: Uneventful            Sign Out: Acceptable/Baseline resp. status   CV/Hemodynamics: Uneventful            Sign Out: Acceptable CV status; No obvious hypovolemia; No obvious fluid overload   Other NRE: NONE   DID A NON-ROUTINE EVENT OCCUR? No           Last vitals:  Vitals Value Taken Time   /70 03/17/25 1215   Temp 36.8  C (98.2  F) 03/17/25 1146   Pulse 75 03/17/25 1220   Resp 17 03/17/25 1220   SpO2 93 % 03/17/25 1220   Vitals shown include unfiled device data.    Electronically Signed By: John Lindsey MD  March 17, 2025  12:22 PM

## 2025-03-20 ENCOUNTER — VIRTUAL VISIT (OUTPATIENT)
Dept: CARDIOLOGY | Facility: CLINIC | Age: 70
End: 2025-03-20
Payer: COMMERCIAL

## 2025-03-20 DIAGNOSIS — Z86.79 STATUS POST ABLATION OF ATRIAL FIBRILLATION: ICD-10-CM

## 2025-03-20 DIAGNOSIS — I48.19 PERSISTENT ATRIAL FIBRILLATION (H): Primary | ICD-10-CM

## 2025-03-20 DIAGNOSIS — Z98.890 STATUS POST ABLATION OF ATRIAL FIBRILLATION: ICD-10-CM

## 2025-03-20 NOTE — PATIENT INSTRUCTIONS
Instructions Following your Ablation Procedure    Your anticoagulation medication Xarelto:  It is important to remain on your anticoagulation medication uninterrupted after your ablation to reduce your risk of a stroke or heart attack, do not stop this medication  Please contact me if you have any questions regarding your anticoagulation medication    Groin care instructions  Keep the site clean and dry, do not place a bandage over the site. If there is minor oozing, apply another bandaid and remove it after 12 hours.  Mild bruising at the access sites is normal. If you notice increased swelling, external bleeding, or have other concerns regarding your access sites please consider emergency department evaluation for significant changes and call your electrophysiology team's office.  You may experience mild discomfort at your groin sites, applying ice packs 20min 3-4 times a day can help alleviate this discomfort.    Activity recommendations  You can resume driving.  Avoid stooping or squatting more than 90 degrees at the hips, repetitive motions such as loading , vacuuming, raking or shoveling, and heavy lifting (greater than 25lbs) for 1 week  Increase your activity gradually over the next 5-10 days, working back to your normal daily activity/routine.    Post ablation instructions  Stay well hydrated, and increase your fluid intake during this recovery period  High protein foods aide in your bodies healing process  You may have some irregular heartbeats and/or atrial fibrillation following your ablation which is normal to recovery, these episodes should occur less frequently over time.   Recurrent atrial fibrillation can occur within the first 3 months post ablation while your heart recovers from the procedure. Please call the electrophysiology team's office if you have an episode lasting greater than 4 hours, or if you notice the episodes are increasing in frequency or duration  Pleuritic chest  discomfort (chest pain worse with taking deep breaths, worse with laying flat on your back) can occur after ablation, usually coming about within the first 24-48hrs post ablation. If this occurs and is severe enough to be troublesome to you, please call us and consider starting a course of ibuprofen 400mg three times daily for 5 to 7 days    Things to watch for  As with any type of procedure, please be more attentive to unusual symptoms post ablation (eg. fever, neurologic changes, pain with swallowing, loss of consciousness, etc) - we recommend ER evaluation for any such symptoms in the first few weeks post procedure.    Consider ER evaluation for the following:  Severe chest pain not relieved by Tylenol or Ibuprofen  You have chills or a fever greater than 101 F (38 C)  Neurologic changes (eg. leg, arm or face weakness or numbness, difficulties with speech or word finding, problems  walking or with your balance, vision changes)  Severe difficulty swallowing and/or you are coughing up blood  Shortness of breath  Increased groin pain or a large or growing hard lump around the site  Groin is red, swollen, hot or tender  Blood or fluid is draining from the groin site  Any numbness, coolness or changes in color in your extremities  Groin pain not relieved by Tylenol or Advil  Recurrent atrial fibrillation associated with sustained rapid heart rates or associated with additional concerning  symptoms.    Your follow up appointments are as follows:  You will be seen by the electrophysiologist nurse practitioner at 6 weeks after your ablation  At your 6 week appointment, a 3 month follow-up appointment will be arranged with either the Nurse Practitioner or the Electrophysiology provider     Sincerely,  Zahra Eubanks RN [phone]    After hours please contact the on call service at # [phone]     Bactrim Counseling:  I discussed with the patient the risks of sulfa antibiotics including but not limited to GI upset, allergic reaction, drug rash, diarrhea, dizziness, photosensitivity, and yeast infections.  Rarely, more serious reactions can occur including but not limited to aplastic anemia, agranulocytosis, methemoglobinemia, blood dyscrasias, liver or kidney failure, lung infiltrates or desquamative/blistering drug rashes.

## 2025-03-20 NOTE — PROGRESS NOTES
Pt is s/p PVI PO day 3, today 3/20/2025 , PVI was completed on 3-17-25 with Dr. Seals and pt was discharged the same day.  PC was placed to pt, spoke to pt    General Assessment:     Weight: Pt reports pt is unable to report weight, but denies s/s of fluid retention    Vital signs:  Pt reports normal heart rates and blood pressure and Pt has been afebrile.    Pain: Pt denies generalized or localized pain abnormal to healing s/p     /GI: Pt denies constipation, denies urinary retention/difficulty, reports decreased appetite, reports normal appetite, and reports staying hydrated.    Respiratory: Pt denies SOB, denies changes/abnormal sputum, and denies any further symptoms abnormal to normal healing process s/p PVI.    Activity: Pt is tolerating advancement in activity while following physical restrictions.     Neurological:  Pt denies vision changes, changes in speech, changes in balance, and changes in strength or motor function.    Rhythm Assessment:   Pt denies palpitations and denies symptoms or sustained AF episodes.    Procedure Site Assessment:   Pt reports some bruising around sites without significant change from hospital discharge and normal to PVI recovery    Anticoagulation/Medication:  Pt remain on Xarelto without interruption  Pt  confirms taking Sotalol as directed.    Education completed with pt at this visit:  Reviewed normal post-op PVI healing process, when to contact EP-RN/EP-MD, contact information was given to the pt for further concerns or questions, after hours contact was reviewed with patient, and pt verbalized understanding    Follow up:  AVS mailed to patient and is available through my chart.  Pt Does have a 6 week follow up scheduled with Elizabeth Villatoro  on 4-28-25 .    3/20/2025 11:01 AM  Zahra Eubanks RN

## 2025-04-28 ENCOUNTER — OFFICE VISIT (OUTPATIENT)
Dept: CARDIOLOGY | Facility: CLINIC | Age: 70
End: 2025-04-28
Payer: COMMERCIAL

## 2025-04-28 VITALS
HEART RATE: 60 BPM | SYSTOLIC BLOOD PRESSURE: 164 MMHG | HEIGHT: 64 IN | RESPIRATION RATE: 16 BRPM | BODY MASS INDEX: 29.53 KG/M2 | DIASTOLIC BLOOD PRESSURE: 84 MMHG | WEIGHT: 173 LBS

## 2025-04-28 DIAGNOSIS — Z98.890 S/P ABLATION OF ATRIAL FIBRILLATION: ICD-10-CM

## 2025-04-28 DIAGNOSIS — I10 BENIGN ESSENTIAL HYPERTENSION: ICD-10-CM

## 2025-04-28 DIAGNOSIS — Z86.79 S/P ABLATION OF ATRIAL FIBRILLATION: ICD-10-CM

## 2025-04-28 DIAGNOSIS — I48.19 PERSISTENT ATRIAL FIBRILLATION (H): Primary | ICD-10-CM

## 2025-04-28 PROCEDURE — 99214 OFFICE O/P EST MOD 30 MIN: CPT | Performed by: NURSE PRACTITIONER

## 2025-04-28 PROCEDURE — 3077F SYST BP >= 140 MM HG: CPT | Performed by: NURSE PRACTITIONER

## 2025-04-28 PROCEDURE — 3079F DIAST BP 80-89 MM HG: CPT | Performed by: NURSE PRACTITIONER

## 2025-04-28 PROCEDURE — G2211 COMPLEX E/M VISIT ADD ON: HCPCS | Performed by: NURSE PRACTITIONER

## 2025-04-28 NOTE — LETTER
4/28/2025    Ankur Queen MD  [address] [place] [address]    RE: Cathy Randall       Dear Colleague,     I had the pleasure of seeing Cathy Randall in the [facility].    HEART CARE ELECTROPHYSIOLOGY NOTE      [facility]  [phone]      Assessment/Recommendations   Assessment/Plan:  1.  Persistent atrial fibrillation/Stage 3B:  No symptomatology or evidence of AF recurrence.  Uneventful recovery from ablation with no emergency department or unscheduled clinic visits.  Reviewed avoidance of possible triggers.  -- Wean sotalol: 80 mg twice daily x 3 weeks, then 40 mg twice daily x 2 weeks, then discontinue    She has a [id number] score of 4 for age 65-74, female gender, HTN, T2DM.  HAS-BLED score of 1 for age.     -- Continue Xarelto 20 mg daily with full meal for stroke prophylaxis.  She reports no missed doses or bleeding issues.      2. Hypertension: BP elevated today in clinic, but has been well-controlled.  Monitor with weaning of sotalol as above.  Follow-up with PCP for evaluation and management.    Follow-up 3 months post PVI     History of Present Illness/Subjective    HPI: Cathy Randall is a 69 year old female who comes in for EP follow up of atrial fibrillation and history and physical prior to pulmonary vein isolation ablation.  She  has a history of persistent atrial fibrillation, hypertension, type 2 diabetes     Arrhythmia history  Dx/date: Atrial fibrillation diagnosed 3/14/2024, symptom onset approximately 1 month prior.  Converted to SR with initiation of sotalol.  Sx: Fatigue, diaphoresis, weakness, mild dyspnea on exertion  [id number] score: 4 for age 65-74, female gender, HTN, T2DM  Oral anticoagulation: Xarelto 20 mg daily  Antiarrhythmic medications, AV pretty blocking agents: Sotalol 80 mg twice daily (3/21/2024, increased to 120 mg twice daily 5/23/2024)  Procedures  DCCV: 4/4/2024--canceled, arrived in SR  Ablation: 3/17/2025  by Dr. Seals (PFA PVI + PWI)     Cathy states that she has been feeling well.  She has not had any AF and has been checking her rhythm using her Kardia device every morning.  She reports an uneventful recovery from ablation with no groin site issues, difficulty swallowing, heartburn, neurologic changes.  She denies chest discomfort, palpitations, abdominal fullness/bloating or peripheral edema, shortness of breath, paroxysmal nocturnal dyspnea, orthopnea, lightheadedness, dizziness, pre-syncope, or syncope.     Cardiographics (EKG personally reviewed):  EKG done 3/17/2025 shows sinus rhythm at 77 bpm, QRS 82 ms, QT/QTc 388/439 ms  EKG done 3/6/2025 shows sinus rhythm at 61 bpm, QRS 76 ms, QT/QTc 458/461 ms  EKG done 2/3/2025 shows sinus rhythm at 57 bpm, QRS 86 ms, QT/QTc 450/438 ms  EKG done 5/28/2024 shows sinus rhythm at 57 bpm, QRS 92 ms, QT/QTc 470/457 ms  EKG done 4/4/2024 shows sinus rhythm at 61 bpm, PACs, QRS 84 ms, QT/QTc 480/483 ms.     MCOT worn 3/29/2024 to 4/27/2024:  Predominant underlying rhythm was sinus rhythm, 46 to 159bpm, average 80 bpm.  Total atrial fibrillation and possible atrial flutter burden was 42%  There were no pauses noted.  Rare supraventricular ectopic beats (1%).  Rare premature ventricular contractions (2%).  Symptom triggers (1 event, other symptoms) correlated to atrial fibrillation.     ECHO done 4/4/2024:  The left ventricle is normal in size. There is normal left ventricular wall  thickness.  Left ventricular systolic function is normal. The visual ejection fraction is  55-60%. No regional wall motion abnormalities noted.     The right ventricle is normal in size and function.  Normal left atrial size. Right atrial size is normal.  There is mild (1+) mitral regurgitation.  Mild (35-45mmHg) pulmonary hypertension is present.  IVC diameter <2.1 cm collapsing >50% with sniff suggests a normal RA pressure  of 3 mmHg.  There is no comparison study available.    I have  "reviewed and updated the patient's Past Medical History, Social History, Family History and Medication List.  Outside records personally reviewed.     Physical Examination  Review of Systems   Vitals: BP (!) 164/84 (BP Location: Right arm, Patient Position: Sitting, Cuff Size: Adult Large)   Pulse 60   Resp 16   Ht 1.626 m (5' 4\")   Wt 78.5 kg (173 lb)   BMI 29.70 kg/m    BMI= Body mass index is 29.7 kg/m .  Wt Readings from Last 3 Encounters:   04/28/25 78.5 kg (173 lb)   03/17/25 78.1 kg (172 lb 3.2 oz)   03/06/25 78.1 kg (172 lb 3.2 oz)       General Appearance:   Alert, well-appearing and in no acute distress.   HEENT: Atraumatic, normocephalic.  No scleral icterus, normal conjunctivae, EOMs intact, PERRL.  Mucous membranes pink and moist.     Chest/Lungs:   Chest symmetric, spine straight.  Respirations unlabored.  Lungs are clear to auscultation.   Cardiovascular:   Regular rate and rhythm.  Normal first and second heart sounds with no murmurs, rubs, or gallops; radial pulses are intact, No edema.   Abdomen:  Soft, nondistended   Extremities: No cyanosis or clubbing.   Musculoskeletal: Moves all extremities.    Skin: Warm, dry, intact.    Neurologic: Mood and affect are appropriate.  Alert and oriented to person, place, time, and situation.     ROS: 10 point ROS neg other than the symptoms noted above in the HPI.         Medical History  Surgical History Family History Social History   Past Medical History:   Diagnosis Date     Benign essential hypertension 05/23/2024     Persistent atrial fibrillation (H) 03/21/2024     Type 2 diabetes mellitus (H) 05/23/2024     Past Surgical History:   Procedure Laterality Date     CATARACT EXTRACTION       EP ABLATION PULMONARY VEIN ISOLATION N/A 3/17/2025    Procedure: Ablation Atrial Fibrillation;  Surgeon: Yuki Seals MD;  Location: [facility] LAB CV     WRIST FRACTURE SURGERY      Right 1990s, Left 2004     Family History   Problem Relation Age of " Onset     Hypertension Mother      Diabetes Mother      Hypertension Father      Diabetes Father      Breast Cancer No family hx of         Social History     Socioeconomic History     Marital status: Single     Spouse name: Not on file     Number of children: Not on file     Years of education: Not on file     Highest education level: Not on file   Occupational History     Not on file   Tobacco Use     Smoking status: Never     Smokeless tobacco: Not on file   Substance and Sexual Activity     Alcohol use: Yes     Drug use: Never     Sexual activity: Not on file   Other Topics Concern     Not on file   Social History Narrative     Not on file     Social Drivers of Health     Financial Resource Strain: Not on file   Food Insecurity: Not on file   Transportation Needs: Not on file   Physical Activity: Not on file   Stress: Not on file   Social Connections: Not on file   Interpersonal Safety: Low Risk  (3/17/2025)    Interpersonal Safety      Do you feel physically and emotionally safe where you currently live?: Yes      Within the past 12 months, have you been hit, slapped, kicked or otherwise physically hurt by someone?: No      Within the past 12 months, have you been humiliated or emotionally abused in other ways by your partner or ex-partner?: No   Housing Stability: Not on file           Medications  Allergies   Current Outpatient Medications   Medication Sig Dispense Refill     amLODIPine-benazepril (LOTREL) 5-40 MG capsule Take 1 capsule by mouth daily       atorvastatin (LIPITOR) 10 MG tablet Take 1 tablet by mouth daily       Calcium Carbonate-Vitamin D 600-5 MG-MCG TABS 2 tablet with a meal Orally Once a day       hydrochlorothiazide (HYDRODIURIL) 25 MG tablet TAKE 1 TABLET BY MOUTH IN THE MORNING ONCE A DAY AS NEEDED FOR FLUID RETENTION       metFORMIN (GLUCOPHAGE XR) 500 MG 24 hr tablet Take 500 mg by mouth 2 times daily (with meals). 2000 mg a day sometimes break it up       Multiple Vitamin  (MULTIVITAMIN ADULT PO) Take 1 tablet by mouth daily       Multiple Vitamins-Minerals (PRESERVISION AREDS) TABS Take 1 tablet by mouth 2 times daily       ONETOUCH VERIO IQ test strip by In Vitro route daily       vitamin E (TOCOPHEROL) 400 units (180 mg) capsule Take 1 tablet by mouth daily       XARELTO ANTICOAGULANT 20 MG TABS tablet Take 20 mg by mouth daily         Allergies   Allergen Reactions     Aspirin Unknown     Doesn't take due to bleeding risk     Penicillins Unknown     Sulfa (Sulfonamide Antibiotics) [Sulfa Antibiotics] Unknown        Denies previous adverse reaction to anesthesia      Lab Results    Chemistry/lipid CBC Cardiac Enzymes/BNP/TSH/INR   Recent Labs   Lab Test 06/18/24  1034   CHOL 132   HDL 48*   LDL 53   TRIG 156*     Recent Labs   Lab Test 06/18/24  1034 06/06/23  0909 06/08/22  0921   LDL 53 104* 106     Recent Labs   Lab Test 03/17/25  1200 03/17/25  0806 03/06/25  0950   NA  --  138 138   POTASSIUM  --  3.8 4.5   CHLORIDE  --  102 100   CO2  --  23 27   ANIONGAP  --  13 11   * 274* 154*   BUN  --  20.9 18.6   CR  --  0.83 0.79   JOY  --  10.3 10.4      CBC RESULTS:   Recent Labs   Lab Test 03/17/25  0806   WBC 11.7*   RBC 5.11   HGB 17.0*   HCT 48.4*   MCV 95   MCH 33.3*   MCHC 35.1   RDW 12.4           TSH   Date Value Ref Range Status   03/14/2024 3.89 0.30 - 4.20 uIU/mL Final          The longitudinal plan of care for the diagnosis(es)/condition(s) as documented were addressed during this visit. Due to the added complexity in care, I will continue to support Cathy in the subsequent management and with ongoing continuity of care.                                            Thank you for allowing me to participate in the care of your patient.      Sincerely,     JAZZMINE Vega CNP     [facility]  cc:   JAZZMINE Vega CNP  [address], [address]  [place] [address]

## 2025-04-28 NOTE — PROGRESS NOTES
HEART CARE ELECTROPHYSIOLOGY NOTE      [facility]  [phone]      Assessment/Recommendations   Assessment/Plan:  1.  Persistent atrial fibrillation/Stage 3B:  No symptomatology or evidence of AF recurrence.  Uneventful recovery from ablation with no emergency department or unscheduled clinic visits.  Reviewed avoidance of possible triggers.  -- Wean sotalol: 80 mg twice daily x 3 weeks, then 40 mg twice daily x 2 weeks, then discontinue    She has a [id number] score of 4 for age 65-74, female gender, HTN, T2DM.  HAS-BLED score of 1 for age.     -- Continue Xarelto 20 mg daily with full meal for stroke prophylaxis.  She reports no missed doses or bleeding issues.      2. Hypertension: BP elevated today in clinic, but has been well-controlled.  Monitor with weaning of sotalol as above.  Follow-up with PCP for evaluation and management.    Follow-up 3 months post PVI     History of Present Illness/Subjective    HPI: Cathy Randall is a 69 year old female who comes in for EP follow up of atrial fibrillation.  She  has a history of persistent atrial fibrillation, hypertension, type 2 diabetes     Arrhythmia history  Dx/date: Atrial fibrillation diagnosed 3/14/2024, symptom onset approximately 1 month prior.  Converted to SR with initiation of sotalol.  Sx: Fatigue, diaphoresis, weakness, mild dyspnea on exertion  [id number] score: 4 for age 65-74, female gender, HTN, T2DM  Oral anticoagulation: Xarelto 20 mg daily  Antiarrhythmic medications, AV pretty blocking agents: Sotalol 80 mg twice daily (3/21/2024, increased to 120 mg twice daily 5/23/2024)  Procedures  DCCV: 4/4/2024--canceled, arrived in SR  Ablation: 3/17/2025 by Dr. Seals (PFA PVI + PWI)     Cathy states that she has been feeling well.  She has not had any AF and has been checking her rhythm using her Kardia device every morning.  She reports an uneventful recovery from ablation with no groin site issues, difficulty  swallowing, heartburn, neurologic changes.  She denies chest discomfort, palpitations, abdominal fullness/bloating or peripheral edema, shortness of breath, paroxysmal nocturnal dyspnea, orthopnea, lightheadedness, dizziness, pre-syncope, or syncope.     Cardiographics (EKG personally reviewed):  EKG done 3/17/2025 shows sinus rhythm at 77 bpm, QRS 82 ms, QT/QTc 388/439 ms  EKG done 3/6/2025 shows sinus rhythm at 61 bpm, QRS 76 ms, QT/QTc 458/461 ms  EKG done 2/3/2025 shows sinus rhythm at 57 bpm, QRS 86 ms, QT/QTc 450/438 ms  EKG done 5/28/2024 shows sinus rhythm at 57 bpm, QRS 92 ms, QT/QTc 470/457 ms  EKG done 4/4/2024 shows sinus rhythm at 61 bpm, PACs, QRS 84 ms, QT/QTc 480/483 ms.     MCOT worn 3/29/2024 to 4/27/2024:  Predominant underlying rhythm was sinus rhythm, 46 to 159bpm, average 80 bpm.  Total atrial fibrillation and possible atrial flutter burden was 42%  There were no pauses noted.  Rare supraventricular ectopic beats (1%).  Rare premature ventricular contractions (2%).  Symptom triggers (1 event, other symptoms) correlated to atrial fibrillation.     ECHO done 4/4/2024:  The left ventricle is normal in size. There is normal left ventricular wall  thickness.  Left ventricular systolic function is normal. The visual ejection fraction is  55-60%. No regional wall motion abnormalities noted.     The right ventricle is normal in size and function.  Normal left atrial size. Right atrial size is normal.  There is mild (1+) mitral regurgitation.  Mild (35-45mmHg) pulmonary hypertension is present.  IVC diameter <2.1 cm collapsing >50% with sniff suggests a normal RA pressure  of 3 mmHg.  There is no comparison study available.    I have reviewed and updated the patient's Past Medical History, Social History, Family History and Medication List.  Outside records personally reviewed.     Physical Examination  Review of Systems   Vitals: BP (!) 164/84 (BP Location: Right arm, Patient Position: Sitting, Cuff  "Size: Adult Large)   Pulse 60   Resp 16   Ht 1.626 m (5' 4\")   Wt 78.5 kg (173 lb)   BMI 29.70 kg/m    BMI= Body mass index is 29.7 kg/m .  Wt Readings from Last 3 Encounters:   04/28/25 78.5 kg (173 lb)   03/17/25 78.1 kg (172 lb 3.2 oz)   03/06/25 78.1 kg (172 lb 3.2 oz)       General Appearance:   Alert, well-appearing and in no acute distress.   HEENT: Atraumatic, normocephalic.  No scleral icterus, normal conjunctivae, EOMs intact, PERRL.  Mucous membranes pink and moist.     Chest/Lungs:   Chest symmetric, spine straight.  Respirations unlabored.  Lungs are clear to auscultation.   Cardiovascular:   Regular rate and rhythm.  Normal first and second heart sounds with no murmurs, rubs, or gallops; radial pulses are intact, No edema.   Abdomen:  Soft, nondistended   Extremities: No cyanosis or clubbing.   Musculoskeletal: Moves all extremities.    Skin: Warm, dry, intact.    Neurologic: Mood and affect are appropriate.  Alert and oriented to person, place, time, and situation.     ROS: 10 point ROS neg other than the symptoms noted above in the HPI.         Medical History  Surgical History Family History Social History   Past Medical History:   Diagnosis Date    Benign essential hypertension 05/23/2024    Persistent atrial fibrillation (H) 03/21/2024    Type 2 diabetes mellitus (H) 05/23/2024     Past Surgical History:   Procedure Laterality Date    CATARACT EXTRACTION      EP ABLATION PULMONARY VEIN ISOLATION N/A 3/17/2025    Procedure: Ablation Atrial Fibrillation;  Surgeon: Yuki Seals MD;  Location: [facility] CV    WRIST FRACTURE SURGERY      Right 1990s, Left 2004     Family History   Problem Relation Age of Onset    Hypertension Mother     Diabetes Mother     Hypertension Father     Diabetes Father     Breast Cancer No family hx of         Social History     Socioeconomic History    Marital status: Single     Spouse name: Not on file    Number of children: Not on file    Years of " education: Not on file    Highest education level: Not on file   Occupational History    Not on file   Tobacco Use    Smoking status: Never    Smokeless tobacco: Not on file   Substance and Sexual Activity    Alcohol use: Yes    Drug use: Never    Sexual activity: Not on file   Other Topics Concern    Not on file   Social History Narrative    Not on file     Social Drivers of Health     Financial Resource Strain: Not on file   Food Insecurity: Not on file   Transportation Needs: Not on file   Physical Activity: Not on file   Stress: Not on file   Social Connections: Not on file   Interpersonal Safety: Low Risk  (3/17/2025)    Interpersonal Safety     Do you feel physically and emotionally safe where you currently live?: Yes     Within the past 12 months, have you been hit, slapped, kicked or otherwise physically hurt by someone?: No     Within the past 12 months, have you been humiliated or emotionally abused in other ways by your partner or ex-partner?: No   Housing Stability: Not on file           Medications  Allergies   Current Outpatient Medications   Medication Sig Dispense Refill    amLODIPine-benazepril (LOTREL) 5-40 MG capsule Take 1 capsule by mouth daily      atorvastatin (LIPITOR) 10 MG tablet Take 1 tablet by mouth daily      Calcium Carbonate-Vitamin D 600-5 MG-MCG TABS 2 tablet with a meal Orally Once a day      hydrochlorothiazide (HYDRODIURIL) 25 MG tablet TAKE 1 TABLET BY MOUTH IN THE MORNING ONCE A DAY AS NEEDED FOR FLUID RETENTION      metFORMIN (GLUCOPHAGE XR) 500 MG 24 hr tablet Take 500 mg by mouth 2 times daily (with meals). 2000 mg a day sometimes break it up      Multiple Vitamin (MULTIVITAMIN ADULT PO) Take 1 tablet by mouth daily      Multiple Vitamins-Minerals (PRESERVISION AREDS) TABS Take 1 tablet by mouth 2 times daily      ONETOUCH VERIO IQ test strip by In Vitro route daily      vitamin E (TOCOPHEROL) 400 units (180 mg) capsule Take 1 tablet by mouth daily      XARELTO ANTICOAGULANT  20 MG TABS tablet Take 20 mg by mouth daily         Allergies   Allergen Reactions    Aspirin Unknown     Doesn't take due to bleeding risk    Penicillins Unknown    Sulfa (Sulfonamide Antibiotics) [Sulfa Antibiotics] Unknown        Denies previous adverse reaction to anesthesia      Lab Results    Chemistry/lipid CBC Cardiac Enzymes/BNP/TSH/INR   Recent Labs   Lab Test 06/18/24  1034   CHOL 132   HDL 48*   LDL 53   TRIG 156*     Recent Labs   Lab Test 06/18/24  1034 06/06/23  0909 06/08/22  0921   LDL 53 104* 106     Recent Labs   Lab Test 03/17/25  1200 03/17/25  0806 03/06/25  0950   NA  --  138 138   POTASSIUM  --  3.8 4.5   CHLORIDE  --  102 100   CO2  --  23 27   ANIONGAP  --  13 11   * 274* 154*   BUN  --  20.9 18.6   CR  --  0.83 0.79   JOY  --  10.3 10.4      CBC RESULTS:   Recent Labs   Lab Test 03/17/25  0806   WBC 11.7*   RBC 5.11   HGB 17.0*   HCT 48.4*   MCV 95   MCH 33.3*   MCHC 35.1   RDW 12.4           TSH   Date Value Ref Range Status   03/14/2024 3.89 0.30 - 4.20 uIU/mL Final          The longitudinal plan of care for the diagnosis(es)/condition(s) as documented were addressed during this visit. Due to the added complexity in care, I will continue to support Cathy in the subsequent management and with ongoing continuity of care.

## 2025-04-28 NOTE — PATIENT INSTRUCTIONS
Cathy Randall,    It was a pleasure to see you today at the [facility].     My recommendations after this visit include:    No activity restrictions.      Decrease sotalol to 80 mg (1 tab) twice daily x 3 weeks, then   40 mg (1/2 tab) twice daily x 2 weeks, then   Stop taking    Your heart rate will increase higher off sotalol.    Monitor blood pressure--if consistently > 140/90 follow up with Dr. Queen for blood pressure medication adjustment    Follow up in 2 months    Elizabeth Villatoro, CNP  [facility], Electrophysiology  [phone]  EP nurses [phone]

## 2025-06-04 ENCOUNTER — TELEPHONE (OUTPATIENT)
Dept: CARDIOLOGY | Facility: CLINIC | Age: 70
End: 2025-06-04
Payer: COMMERCIAL

## 2025-06-04 NOTE — TELEPHONE ENCOUNTER
Andre Johnson,     This patient was recently seen 4/2025  S/p PVI 3/17.  Weaned off of sotalol.  Currently on day 2 of no sotalol.  This morning she experienced about an hour of tachycardia with no exertion.  Hrs were 100-110s.  She did her kardia monitor and it read tachycardia.  Denies AF.  She does admit feeling anxious about it.  We reviewed hydration status, she feels she could be better about this.  I told her likely we will monitor this for now and let her body adjust.  Do you want to make any changes?    Thank you!  Rocío

## 2025-06-04 NOTE — TELEPHONE ENCOUNTER
Health Call Center    Phone Message    May a detailed message be left on voicemail: yes     Reason for Call: Symptoms or Concerns     If patient has red-flag symptoms, warm transfer to triage line    Current symptom or concern: Tachycardiac at 9am but back in rhythm     Symptoms have been present for: 1 hour(s)    Has patient previously been seen for this? Yes    By: Irving     Date:     Are there any new or worsening symptoms? Yes: Patient stopped medication but now this morning at 9am heart rate 108 but now back 98    Please reach out.        Action Taken: Other: cardiology     Travel Screening: Not Applicable    Thank you!  [facility]       Date of Service:                                                                      No

## 2025-06-04 NOTE — TELEPHONE ENCOUNTER
"Noted.  Phone call to patient, she states she checked her Suri DICKEY obile again this afternoon and her rates are still about 110, reading at \"tachycardia\".  She states she is feeling fine, no symptoms of shortness of breath, dizziness or lightheadedness.      Reviewed instructions from Elizabeth.  Encouraged her to continue to monitor her symptoms today, to rest as needed and to hydrate.  Asked her to call our team tomorrow if she continues to have rates >100, reviewed contact information.  She states understanding.  "

## 2025-06-04 NOTE — TELEPHONE ENCOUNTER
Lets just monitor and see how she does.  It may be just her body getting used to not having any AV pretty blocking agents on board.  However, if she continues to have episodes, document with her Maurydia and let us know at which point I may restart her on low-dose sotalol versus trialing low-dose metoprolol.  Thanks,  Elizabeth

## 2025-06-05 ENCOUNTER — TELEPHONE (OUTPATIENT)
Dept: CARDIOLOGY | Facility: CLINIC | Age: 70
End: 2025-06-05
Payer: COMMERCIAL

## 2025-06-05 NOTE — TELEPHONE ENCOUNTER
"Return call from patient, she states that her heart rates continue to be elevated, this am rates are 110, last night before she went to bed her hr was 111.  She states she feels a little tired and a little weak, has some mild shortness of breath, her Kardia is still reporting \"tachycardia\", no afib noted on her device.    Will review with Elizabeth and call patient with any additional recommendations.  "

## 2025-06-12 ENCOUNTER — HOSPITAL ENCOUNTER (OUTPATIENT)
Dept: CARDIOLOGY | Facility: HOSPITAL | Age: 70
End: 2025-06-12
Attending: NURSE PRACTITIONER
Payer: COMMERCIAL

## 2025-06-12 DIAGNOSIS — I48.19 PERSISTENT ATRIAL FIBRILLATION (H): ICD-10-CM

## 2025-06-12 LAB
ATRIAL RATE - MUSE: 72 BPM
DIASTOLIC BLOOD PRESSURE - MUSE: NORMAL MMHG
INTERPRETATION ECG - MUSE: NORMAL
P AXIS - MUSE: 60 DEGREES
PR INTERVAL - MUSE: 136 MS
QRS DURATION - MUSE: 80 MS
QT - MUSE: 386 MS
QTC - MUSE: 422 MS
R AXIS - MUSE: 60 DEGREES
SYSTOLIC BLOOD PRESSURE - MUSE: NORMAL MMHG
T AXIS - MUSE: 2 DEGREES
VENTRICULAR RATE- MUSE: 72 BPM

## 2025-06-13 ENCOUNTER — RESULTS FOLLOW-UP (OUTPATIENT)
Dept: CARDIOLOGY | Facility: CLINIC | Age: 70
End: 2025-06-13

## 2025-06-28 ENCOUNTER — HEALTH MAINTENANCE LETTER (OUTPATIENT)
Age: 70
End: 2025-06-28

## 2025-07-08 ENCOUNTER — LAB REQUISITION (OUTPATIENT)
Dept: LAB | Facility: CLINIC | Age: 70
End: 2025-07-08

## 2025-07-08 DIAGNOSIS — E78.5 HYPERLIPIDEMIA, UNSPECIFIED: ICD-10-CM

## 2025-07-08 DIAGNOSIS — E11.65 TYPE 2 DIABETES MELLITUS WITH HYPERGLYCEMIA (H): ICD-10-CM

## 2025-07-08 DIAGNOSIS — I10 ESSENTIAL (PRIMARY) HYPERTENSION: ICD-10-CM

## 2025-07-08 LAB
ALBUMIN SERPL BCG-MCNC: 4.6 G/DL (ref 3.5–5.2)
ALP SERPL-CCNC: 56 U/L (ref 40–150)
ALT SERPL W P-5'-P-CCNC: 31 U/L (ref 0–50)
ANION GAP SERPL CALCULATED.3IONS-SCNC: 14 MMOL/L (ref 7–15)
AST SERPL W P-5'-P-CCNC: 24 U/L (ref 0–45)
BILIRUB SERPL-MCNC: 1.4 MG/DL
BUN SERPL-MCNC: 19.6 MG/DL (ref 8–23)
CALCIUM SERPL-MCNC: 10.5 MG/DL (ref 8.8–10.4)
CHLORIDE SERPL-SCNC: 98 MMOL/L (ref 98–107)
CHOLEST SERPL-MCNC: 154 MG/DL
CREAT SERPL-MCNC: 0.85 MG/DL (ref 0.51–0.95)
EGFRCR SERPLBLD CKD-EPI 2021: 73 ML/MIN/1.73M2
FASTING STATUS PATIENT QL REPORTED: ABNORMAL
FASTING STATUS PATIENT QL REPORTED: NORMAL
GLUCOSE SERPL-MCNC: 208 MG/DL (ref 70–99)
HCO3 SERPL-SCNC: 26 MMOL/L (ref 22–29)
HDLC SERPL-MCNC: 58 MG/DL
LDLC SERPL CALC-MCNC: 69 MG/DL
NONHDLC SERPL-MCNC: 96 MG/DL
POTASSIUM SERPL-SCNC: 4.1 MMOL/L (ref 3.4–5.3)
PROT SERPL-MCNC: 8 G/DL (ref 6.4–8.3)
SODIUM SERPL-SCNC: 138 MMOL/L (ref 135–145)
TRIGL SERPL-MCNC: 134 MG/DL

## 2025-07-08 PROCEDURE — 84478 ASSAY OF TRIGLYCERIDES: CPT | Performed by: FAMILY MEDICINE

## 2025-07-08 PROCEDURE — 84450 TRANSFERASE (AST) (SGOT): CPT | Performed by: FAMILY MEDICINE

## 2025-07-08 PROCEDURE — 82043 UR ALBUMIN QUANTITATIVE: CPT | Performed by: FAMILY MEDICINE

## 2025-07-09 LAB
CREAT UR-MCNC: 101 MG/DL
MICROALBUMIN UR-MCNC: <12 MG/L
MICROALBUMIN/CREAT UR: NORMAL MG/G{CREAT}

## 2025-07-24 ENCOUNTER — OFFICE VISIT (OUTPATIENT)
Dept: CARDIOLOGY | Facility: CLINIC | Age: 70
End: 2025-07-24
Payer: COMMERCIAL

## 2025-07-24 VITALS
HEIGHT: 64 IN | BODY MASS INDEX: 29.19 KG/M2 | SYSTOLIC BLOOD PRESSURE: 136 MMHG | DIASTOLIC BLOOD PRESSURE: 84 MMHG | WEIGHT: 171 LBS | HEART RATE: 84 BPM | RESPIRATION RATE: 16 BRPM

## 2025-07-24 DIAGNOSIS — I48.19 PERSISTENT ATRIAL FIBRILLATION (H): Primary | ICD-10-CM

## 2025-07-24 DIAGNOSIS — Z86.79 S/P ABLATION OF ATRIAL FIBRILLATION: ICD-10-CM

## 2025-07-24 DIAGNOSIS — Z98.890 S/P ABLATION OF ATRIAL FIBRILLATION: ICD-10-CM

## 2025-07-24 DIAGNOSIS — I10 BENIGN ESSENTIAL HYPERTENSION: ICD-10-CM

## 2025-07-24 RX ORDER — SOTALOL HYDROCHLORIDE 80 MG/1
40 TABLET ORAL 2 TIMES DAILY
Qty: 90 TABLET | Refills: 3 | Status: SHIPPED | OUTPATIENT
Start: 2025-07-24

## 2025-07-24 RX ORDER — SEMAGLUTIDE 0.68 MG/ML
INJECTION, SOLUTION SUBCUTANEOUS
COMMUNITY
Start: 2025-07-10

## 2025-07-24 NOTE — PATIENT INSTRUCTIONS
Cathy Randall,    It was a pleasure to see you today at the [facility].     My recommendations after this visit include:    Continue sotalol 40 mg (1/2 tab) twice daily  Spot check heart rhythm using Kardia device    Follow up in 6 months    Elizabeth Villatoro, CNP  [facility], Electrophysiology  [phone]  EP nurses [phone]

## 2025-07-24 NOTE — LETTER
7/24/2025    Ankur Queen MD  [address] [place] [address]    RE: Cathy Randall       Dear Colleague,     [facility] the [facility] [place].    HEART CARE ELECTROPHYSIOLOGY NOTE      [facility]  [phone]      Assessment/Recommendations   Assessment/Plan:  1.  Persistent atrial fibrillation/Stage 3B: Evidence of recurrent AT/AFL at 110 bpm documented with Kardia device, well suppressed on very low-dose sotalol.  Occurred 3 months post ablation.  Discussed doing a trial off sotalol, but as she feels better than she has in a long time, decision made to continue for now.  -- Continue sotalol 40 mg twice daily  -- Utilize Kardia device for rhythm monitoring    She has a [id number] score of 4 for age 65-74, female gender, HTN, T2DM.  HAS-BLED score of 1 for age.     -- Continue Xarelto 20 mg daily with full meal for stroke prophylaxis.  She reports no missed doses or bleeding issues.      2. Hypertension: Controlled    Follow-up in 6 months     History of Present Illness/Subjective    HPI: Cathy Randall is a 69 year old female who comes in for EP follow up of atrial fibrillation.  She  has a history of persistent atrial fibrillation, hypertension, type 2 diabetes     Arrhythmia history  Dx/date: Atrial fibrillation diagnosed 3/14/2024, symptom onset approximately 1 month prior.  Converted to SR with initiation of sotalol.  Sx: Fatigue, diaphoresis, weakness, mild dyspnea on exertion  [id number] score: 4 for age 65-74, female gender, HTN, T2DM  Oral anticoagulation: Xarelto 20 mg daily  Antiarrhythmic medications, AV pretty blocking agents: Sotalol (80 mg twice daily 3/21/2024, 120 mg twice daily 5/23/2024, weaned off 5/2025.  Restarted 40 mg twice daily 6/11/2025-present)  Procedures  DCCV: 4/4/2024--canceled, arrived in SR  Ablation: 3/17/2025 by Dr. Seals (PFA PVI + PWI)     Cathy states that she feels better than she  "has in a long time.  She had episodes of \"tachycardia\" after stopping sotalol, but none since restarting it at 40 mg twice a day.  She has been checking her rhythm using her Kardia device every morning.  She denies chest discomfort, palpitations, abdominal fullness/bloating or peripheral edema, shortness of breath, paroxysmal nocturnal dyspnea, orthopnea, lightheadedness, dizziness, pre-syncope, or syncope.     Cardiographics (EKG, Kardia ECGs personally reviewed):  EKG done 6/12/2025 shows sinus rhythm at 72 bpm, QRS 80 ms, QT/Qtc 386/422 ms  EKG done 3/17/2025 shows sinus rhythm at 77 bpm, QRS 82 ms, QT/QTc 388/439 ms  EKG done 3/6/2025 shows sinus rhythm at 61 bpm, QRS 76 ms, QT/QTc 458/461 ms  EKG done 2/3/2025 shows sinus rhythm at 57 bpm, QRS 86 ms, QT/QTc 450/438 ms  EKG done 5/28/2024 shows sinus rhythm at 57 bpm, QRS 92 ms, QT/QTc 470/457 ms  EKG done 4/4/2024 shows sinus rhythm at 61 bpm, PACs, QRS 84 ms, QT/QTc 480/483 ms.     Kardia ECGs in early 6/2025 show episodes of AT versus AFL at 110 bpm, subsequent ECGs show sinus rhythm    MCOT worn 3/29/2024 to 4/27/2024:  Predominant underlying rhythm was sinus rhythm, 46 to 159bpm, average 80 bpm.  Total atrial fibrillation and possible atrial flutter burden was 42%  There were no pauses noted.  Rare supraventricular ectopic beats (1%).  Rare premature ventricular contractions (2%).  Symptom triggers (1 event, other symptoms) correlated to atrial fibrillation.     ECHO done 4/4/2024:  The left ventricle is normal in size. There is normal left ventricular wall  thickness.  Left ventricular systolic function is normal. The visual ejection fraction is  55-60%. No regional wall motion abnormalities noted.     The right ventricle is normal in size and function.  Normal left atrial size. Right atrial size is normal.  There is mild (1+) mitral regurgitation.  Mild (35-45mmHg) pulmonary hypertension is present.  IVC diameter <2.1 cm collapsing >50% with sniff " "suggests a normal RA pressure  of 3 mmHg.  There is no comparison study available.    I have reviewed and updated the patient's Past Medical History, Social History, Family History and Medication List.  Outside records personally reviewed.     Physical Examination  Review of Systems   Vitals: /84 (BP Location: Right arm, Patient Position: Sitting, Cuff Size: Adult Large)   Pulse 84   Resp 16   Ht 1.626 m (5' 4\")   Wt 77.6 kg (171 lb)   BMI 29.35 kg/m    BMI= Body mass index is 29.35 kg/m .  Wt Readings from Last 3 Encounters:   07/24/25 77.6 kg (171 lb)   04/28/25 78.5 kg (173 lb)   03/17/25 78.1 kg (172 lb 3.2 oz)       General Appearance:   Alert, well-appearing and in no acute distress.   HEENT: Atraumatic, normocephalic.  No scleral icterus, normal conjunctivae, EOMs intact, PERRL.  Mucous membranes pink and moist.     Chest/Lungs:   Chest symmetric, spine straight.  Respirations unlabored.  Lungs are clear to auscultation.   Cardiovascular:   Regular rate and rhythm.  Normal first and second heart sounds with no murmurs, rubs, or gallops; radial pulses are intact, No edema.   Abdomen:  Soft, nondistended   Extremities: No cyanosis or clubbing.   Musculoskeletal: Moves all extremities.    Skin: Warm, dry, intact.    Neurologic: Mood and affect are appropriate.  Alert and oriented to person, place, time, and situation.     ROS: 10 point ROS neg other than the symptoms noted above in the HPI.         Medical History  Surgical History Family History Social History   Past Medical History:   Diagnosis Date     Benign essential hypertension 05/23/2024     Persistent atrial fibrillation (H) 03/21/2024     Type 2 diabetes mellitus (H) 05/23/2024     Past Surgical History:   Procedure Laterality Date     CATARACT EXTRACTION       EP ABLATION PULMONARY VEIN ISOLATION N/A 3/17/2025    Procedure: Ablation Atrial Fibrillation;  Surgeon: Yuki Seals MD;  Location: [facility] LAB CV     WRIST FRACTURE " SURGERY      Right 1990s, Left 2004     Family History   Problem Relation Age of Onset     Hypertension Mother      Diabetes Mother      Hypertension Father      Diabetes Father      Breast Cancer No family hx of         Social History     Socioeconomic History     Marital status: Single     Spouse name: Not on file     Number of children: Not on file     Years of education: Not on file     Highest education level: Not on file   Occupational History     Not on file   Tobacco Use     Smoking status: Never     Smokeless tobacco: Not on file   Substance and Sexual Activity     Alcohol use: Yes     Drug use: Never     Sexual activity: Not on file   Other Topics Concern     Not on file   Social History Narrative     Not on file     Social Drivers of Health     Financial Resource Strain: Not on file   Food Insecurity: Not on file   Transportation Needs: Not on file   Physical Activity: Not on file   Stress: Not on file   Social Connections: Not on file   Interpersonal Safety: Low Risk  (3/17/2025)    Interpersonal Safety      Do you feel physically and emotionally safe where you currently live?: Yes      Within the past 12 months, have you been hit, slapped, kicked or otherwise physically hurt by someone?: No      Within the past 12 months, have you been humiliated or emotionally abused in other ways by your partner or ex-partner?: No   Housing Stability: Not on file           Medications  Allergies   Current Outpatient Medications   Medication Sig Dispense Refill     amLODIPine-benazepril (LOTREL) 5-40 MG capsule Take 1 capsule by mouth daily       atorvastatin (LIPITOR) 10 MG tablet Take 1 tablet by mouth daily       Calcium Carbonate-Vitamin D 600-5 MG-MCG TABS 2 tablet with a meal Orally Once a day       hydrochlorothiazide (HYDRODIURIL) 25 MG tablet TAKE 1 TABLET BY MOUTH IN THE MORNING ONCE A DAY AS NEEDED FOR FLUID RETENTION       metFORMIN (GLUCOPHAGE XR) 500 MG 24 hr tablet Take 500 mg by mouth 2 times daily  (with meals). 2000 mg a day sometimes break it up       Multiple Vitamin (MULTIVITAMIN ADULT PO) Take 1 tablet by mouth daily       Multiple Vitamins-Minerals (PRESERVISION AREDS) TABS Take 1 tablet by mouth 2 times daily       ONETOUCH VERIO IQ test strip by In Vitro route daily       OZEMPIC, 0.25 OR 0.5 MG/DOSE, 2 MG/3ML pen 0.25 mg weekly x 4 weeks, then 0.5mg Subcutaneous once a week       sotalol (BETAPACE) 80 MG tablet Take 0.5 tablets (40 mg) by mouth 2 times daily. 90 tablet 3     vitamin E (TOCOPHEROL) 400 units (180 mg) capsule Take 1 tablet by mouth daily       XARELTO ANTICOAGULANT 20 MG TABS tablet Take 20 mg by mouth daily         Allergies   Allergen Reactions     Aspirin Unknown     Doesn't take due to bleeding risk     Penicillins Unknown     Sulfa (Sulfonamide Antibiotics) [Sulfa Antibiotics] Unknown        Denies previous adverse reaction to anesthesia      Lab Results    Chemistry/lipid CBC Cardiac Enzymes/BNP/TSH/INR   Recent Labs   Lab Test 07/08/25  0844 06/18/24  1034   CHOL 154 132   HDL 58 48*   LDL 69 53   TRIG 134 156*     Recent Labs   Lab Test 07/08/25  0844 03/17/25  1200 03/17/25  0806     --  138   POTASSIUM 4.1  --  3.8   CHLORIDE 98  --  102   CO2 26  --  23   ANIONGAP 14  --  13   * 155* 274*   BUN 19.6  --  20.9   CR 0.85  --  0.83   JOY 10.5*  --  10.3      CBC RESULTS:   Recent Labs   Lab Test 03/17/25  0806   WBC 11.7*   RBC 5.11   HGB 17.0*   HCT 48.4*   MCV 95   MCH 33.3*   MCHC 35.1   RDW 12.4               TSH   Date Value Ref Range Status   03/14/2024 3.89 0.30 - 4.20 uIU/mL Final          The longitudinal plan of care for the diagnosis(es)/condition(s) as documented were addressed during this visit. Due to the added complexity in care, I will continue to support Cathy in the subsequent management and with ongoing continuity of care.                                          Thank you for allowing me to participate in the care of your  patient.      Sincerely,     JAZZMINE Vega CNP     [facility]  cc:   JAZZMINE Vega CNP  [address], [address]  [place] [address]

## 2025-07-24 NOTE — PROGRESS NOTES
"  HEART CARE ELECTROPHYSIOLOGY NOTE      [facility]  [phone]      Assessment/Recommendations   Assessment/Plan:  1.  Persistent atrial fibrillation/Stage 3B: Evidence of recurrent AT/AFL at 110 bpm documented with Kardia device, well suppressed on very low-dose sotalol.  Occurred 3 months post ablation.  Discussed doing a trial off sotalol, but as she feels better than she has in a long time, decision made to continue for now.  -- Continue sotalol 40 mg twice daily  -- Utilize Kardia device for rhythm monitoring    She has a [id number] score of 4 for age 65-74, female gender, HTN, T2DM.  HAS-BLED score of 1 for age.     -- Continue Xarelto 20 mg daily with full meal for stroke prophylaxis.  She reports no missed doses or bleeding issues.      2. Hypertension: Controlled    Follow-up in 6 months     History of Present Illness/Subjective    HPI: Cathy Randall is a 69 year old female who comes in for EP follow up of atrial fibrillation.  She  has a history of persistent atrial fibrillation, hypertension, type 2 diabetes     Arrhythmia history  Dx/date: Atrial fibrillation diagnosed 3/14/2024, symptom onset approximately 1 month prior.  Converted to SR with initiation of sotalol.  Sx: Fatigue, diaphoresis, weakness, mild dyspnea on exertion  [id number] score: 4 for age 65-74, female gender, HTN, T2DM  Oral anticoagulation: Xarelto 20 mg daily  Antiarrhythmic medications, AV pretty blocking agents: Sotalol (80 mg twice daily 3/21/2024, 120 mg twice daily 5/23/2024, weaned off 5/2025.  Restarted 40 mg twice daily 6/11/2025-present)  Procedures  DCCV: 4/4/2024--canceled, arrived in SR  Ablation: 3/17/2025 by Dr. Seals (PFA PVI + PWI)     Cathy states that she feels better than she has in a long time.  She had episodes of \"tachycardia\" after stopping sotalol, but none since restarting it at 40 mg twice a day.  She has been checking her rhythm using her Kardia device every morning.  She " denies chest discomfort, palpitations, abdominal fullness/bloating or peripheral edema, shortness of breath, paroxysmal nocturnal dyspnea, orthopnea, lightheadedness, dizziness, pre-syncope, or syncope.     Cardiographics (EKG, Kardia ECGs personally reviewed):  EKG done 6/12/2025 shows sinus rhythm at 72 bpm, QRS 80 ms, QT/Qtc 386/422 ms  EKG done 3/17/2025 shows sinus rhythm at 77 bpm, QRS 82 ms, QT/QTc 388/439 ms  EKG done 3/6/2025 shows sinus rhythm at 61 bpm, QRS 76 ms, QT/QTc 458/461 ms  EKG done 2/3/2025 shows sinus rhythm at 57 bpm, QRS 86 ms, QT/QTc 450/438 ms  EKG done 5/28/2024 shows sinus rhythm at 57 bpm, QRS 92 ms, QT/QTc 470/457 ms  EKG done 4/4/2024 shows sinus rhythm at 61 bpm, PACs, QRS 84 ms, QT/QTc 480/483 ms.     Kardia ECGs in early 6/2025 show episodes of AT versus AFL at 110 bpm, subsequent ECGs show sinus rhythm    MCOT worn 3/29/2024 to 4/27/2024:  Predominant underlying rhythm was sinus rhythm, 46 to 159bpm, average 80 bpm.  Total atrial fibrillation and possible atrial flutter burden was 42%  There were no pauses noted.  Rare supraventricular ectopic beats (1%).  Rare premature ventricular contractions (2%).  Symptom triggers (1 event, other symptoms) correlated to atrial fibrillation.     ECHO done 4/4/2024:  The left ventricle is normal in size. There is normal left ventricular wall  thickness.  Left ventricular systolic function is normal. The visual ejection fraction is  55-60%. No regional wall motion abnormalities noted.     The right ventricle is normal in size and function.  Normal left atrial size. Right atrial size is normal.  There is mild (1+) mitral regurgitation.  Mild (35-45mmHg) pulmonary hypertension is present.  IVC diameter <2.1 cm collapsing >50% with sniff suggests a normal RA pressure  of 3 mmHg.  There is no comparison study available.    I have reviewed and updated the patient's Past Medical History, Social History, Family History and Medication List.  Outside  "records personally reviewed.     Physical Examination  Review of Systems   Vitals: /84 (BP Location: Right arm, Patient Position: Sitting, Cuff Size: Adult Large)   Pulse 84   Resp 16   Ht 1.626 m (5' 4\")   Wt 77.6 kg (171 lb)   BMI 29.35 kg/m    BMI= Body mass index is 29.35 kg/m .  Wt Readings from Last 3 Encounters:   07/24/25 77.6 kg (171 lb)   04/28/25 78.5 kg (173 lb)   03/17/25 78.1 kg (172 lb 3.2 oz)       General Appearance:   Alert, well-appearing and in no acute distress.   HEENT: Atraumatic, normocephalic.  No scleral icterus, normal conjunctivae, EOMs intact, PERRL.  Mucous membranes pink and moist.     Chest/Lungs:   Chest symmetric, spine straight.  Respirations unlabored.  Lungs are clear to auscultation.   Cardiovascular:   Regular rate and rhythm.  Normal first and second heart sounds with no murmurs, rubs, or gallops; radial pulses are intact, No edema.   Abdomen:  Soft, nondistended   Extremities: No cyanosis or clubbing.   Musculoskeletal: Moves all extremities.    Skin: Warm, dry, intact.    Neurologic: Mood and affect are appropriate.  Alert and oriented to person, place, time, and situation.     ROS: 10 point ROS neg other than the symptoms noted above in the HPI.         Medical History  Surgical History Family History Social History   Past Medical History:   Diagnosis Date    Benign essential hypertension 05/23/2024    Persistent atrial fibrillation (H) 03/21/2024    Type 2 diabetes mellitus (H) 05/23/2024     Past Surgical History:   Procedure Laterality Date    CATARACT EXTRACTION      EP ABLATION PULMONARY VEIN ISOLATION N/A 3/17/2025    Procedure: Ablation Atrial Fibrillation;  Surgeon: Yuki Seals MD;  Location: [facility]    WRIST FRACTURE SURGERY      Right 1990s, Left 2004     Family History   Problem Relation Age of Onset    Hypertension Mother     Diabetes Mother     Hypertension Father     Diabetes Father     Breast Cancer No family hx of         " Social History     Socioeconomic History    Marital status: Single     Spouse name: Not on file    Number of children: Not on file    Years of education: Not on file    Highest education level: Not on file   Occupational History    Not on file   Tobacco Use    Smoking status: Never    Smokeless tobacco: Not on file   Substance and Sexual Activity    Alcohol use: Yes    Drug use: Never    Sexual activity: Not on file   Other Topics Concern    Not on file   Social History Narrative    Not on file     Social Drivers of Health     Financial Resource Strain: Not on file   Food Insecurity: Not on file   Transportation Needs: Not on file   Physical Activity: Not on file   Stress: Not on file   Social Connections: Not on file   Interpersonal Safety: Low Risk  (3/17/2025)    Interpersonal Safety     Do you feel physically and emotionally safe where you currently live?: Yes     Within the past 12 months, have you been hit, slapped, kicked or otherwise physically hurt by someone?: No     Within the past 12 months, have you been humiliated or emotionally abused in other ways by your partner or ex-partner?: No   Housing Stability: Not on file           Medications  Allergies   Current Outpatient Medications   Medication Sig Dispense Refill    amLODIPine-benazepril (LOTREL) 5-40 MG capsule Take 1 capsule by mouth daily      atorvastatin (LIPITOR) 10 MG tablet Take 1 tablet by mouth daily      Calcium Carbonate-Vitamin D 600-5 MG-MCG TABS 2 tablet with a meal Orally Once a day      hydrochlorothiazide (HYDRODIURIL) 25 MG tablet TAKE 1 TABLET BY MOUTH IN THE MORNING ONCE A DAY AS NEEDED FOR FLUID RETENTION      metFORMIN (GLUCOPHAGE XR) 500 MG 24 hr tablet Take 500 mg by mouth 2 times daily (with meals). 2000 mg a day sometimes break it up      Multiple Vitamin (MULTIVITAMIN ADULT PO) Take 1 tablet by mouth daily      Multiple Vitamins-Minerals (PRESERVISION AREDS) TABS Take 1 tablet by mouth 2 times daily      ONETOUCH VERIO IQ  test strip by In Vitro route daily      OZEMPIC, 0.25 OR 0.5 MG/DOSE, 2 MG/3ML pen 0.25 mg weekly x 4 weeks, then 0.5mg Subcutaneous once a week      sotalol (BETAPACE) 80 MG tablet Take 0.5 tablets (40 mg) by mouth 2 times daily. 90 tablet 3    vitamin E (TOCOPHEROL) 400 units (180 mg) capsule Take 1 tablet by mouth daily      XARELTO ANTICOAGULANT 20 MG TABS tablet Take 20 mg by mouth daily         Allergies   Allergen Reactions    Aspirin Unknown     Doesn't take due to bleeding risk    Penicillins Unknown    Sulfa (Sulfonamide Antibiotics) [Sulfa Antibiotics] Unknown        Denies previous adverse reaction to anesthesia      Lab Results    Chemistry/lipid CBC Cardiac Enzymes/BNP/TSH/INR   Recent Labs   Lab Test 07/08/25  0844 06/18/24  1034   CHOL 154 132   HDL 58 48*   LDL 69 53   TRIG 134 156*     Recent Labs   Lab Test 07/08/25  0844 03/17/25  1200 03/17/25  0806     --  138   POTASSIUM 4.1  --  3.8   CHLORIDE 98  --  102   CO2 26  --  23   ANIONGAP 14  --  13   * 155* 274*   BUN 19.6  --  20.9   CR 0.85  --  0.83   JOY 10.5*  --  10.3      CBC RESULTS:   Recent Labs   Lab Test 03/17/25  0806   WBC 11.7*   RBC 5.11   HGB 17.0*   HCT 48.4*   MCV 95   MCH 33.3*   MCHC 35.1   RDW 12.4               TSH   Date Value Ref Range Status   03/14/2024 3.89 0.30 - 4.20 uIU/mL Final          The longitudinal plan of care for the diagnosis(es)/condition(s) as documented were addressed during this visit. Due to the added complexity in care, I will continue to support Cathy in the subsequent management and with ongoing continuity of care.

## (undated) DEVICE — INTRO TERUMO 8FRX25CM W/MARKER RSB803

## (undated) DEVICE — 8F SOUNDSTAR ECO ULTRASOUND CATHETER

## (undated) DEVICE — PULSED FIELD ABLATION CATHETER, 31MM

## (undated) DEVICE — TRANSDUCER TRAY ARTERIAL 42646-06

## (undated) DEVICE — PATCH CARTO 3 EXTERNAL REFERENCE 3D MAPPING CREFP6

## (undated) DEVICE — SHEATH PINNACLE 9FR 10CM W/MARKER

## (undated) DEVICE — 300CM, CATHETER CONNECTION CABLE

## (undated) DEVICE — Device

## (undated) DEVICE — CATH EP 7FR X 115CM DECANAV CA

## (undated) DEVICE — GUIDEWIRE JTIP 3MM .035 180CM IQ35F180J3

## (undated) DEVICE — INTRODUCER CHECK FLO 18FRX30CM .038 RCFW-18.0P-38-30-RB

## (undated) DEVICE — STEERABLE SHEATH CLEAR, 13F

## (undated) DEVICE — CATHETER OCTARAY LONG SPLINE CURVE F 3-3-3-3-3 D160906

## (undated) DEVICE — ELECTRODE DEFIB CADENCE 22550R

## (undated) DEVICE — GUIDE WIRE SHEATH VERSACROSS D1 CURVE 93CM L180 VXAK0041

## (undated) DEVICE — CUSTOM PACK EP

## (undated) RX ORDER — FENTANYL CITRATE 50 UG/ML
INJECTION, SOLUTION INTRAMUSCULAR; INTRAVENOUS
Status: DISPENSED
Start: 2025-03-17